# Patient Record
Sex: FEMALE | Race: BLACK OR AFRICAN AMERICAN | NOT HISPANIC OR LATINO | Employment: FULL TIME | URBAN - METROPOLITAN AREA
[De-identification: names, ages, dates, MRNs, and addresses within clinical notes are randomized per-mention and may not be internally consistent; named-entity substitution may affect disease eponyms.]

---

## 2017-02-08 ENCOUNTER — OFFICE VISIT (OUTPATIENT)
Dept: FAMILY MEDICINE | Facility: CLINIC | Age: 47
End: 2017-02-08
Payer: COMMERCIAL

## 2017-02-08 VITALS
WEIGHT: 175 LBS | HEART RATE: 83 BPM | TEMPERATURE: 96.7 F | SYSTOLIC BLOOD PRESSURE: 121 MMHG | DIASTOLIC BLOOD PRESSURE: 84 MMHG | BODY MASS INDEX: 28.12 KG/M2 | HEIGHT: 66 IN | OXYGEN SATURATION: 100 %

## 2017-02-08 DIAGNOSIS — Z00.00 ANNUAL VISIT FOR GENERAL ADULT MEDICAL EXAMINATION WITHOUT ABNORMAL FINDINGS: Primary | ICD-10-CM

## 2017-02-08 DIAGNOSIS — E55.9 VITAMIN D DEFICIENCY: Primary | ICD-10-CM

## 2017-02-08 DIAGNOSIS — I10 BENIGN ESSENTIAL HYPERTENSION: ICD-10-CM

## 2017-02-08 DIAGNOSIS — E55.9 VITAMIN D DEFICIENCY: ICD-10-CM

## 2017-02-08 DIAGNOSIS — Z12.31 VISIT FOR SCREENING MAMMOGRAM: ICD-10-CM

## 2017-02-08 DIAGNOSIS — Z90.711 S/P ABDOMINAL SUPRACERVICAL SUBTOTAL HYSTERECTOMY: ICD-10-CM

## 2017-02-08 DIAGNOSIS — R07.9 ACUTE CHEST PAIN: ICD-10-CM

## 2017-02-08 LAB
ALBUMIN SERPL-MCNC: 3.9 G/DL (ref 3.4–5)
ALP SERPL-CCNC: 55 U/L (ref 40–150)
ALT SERPL W P-5'-P-CCNC: 27 U/L (ref 0–50)
ANION GAP SERPL CALCULATED.3IONS-SCNC: 8 MMOL/L (ref 3–14)
AST SERPL W P-5'-P-CCNC: 22 U/L (ref 0–45)
BASOPHILS # BLD AUTO: 0 10E9/L (ref 0–0.2)
BASOPHILS NFR BLD AUTO: 0.2 %
BILIRUB SERPL-MCNC: 0.8 MG/DL (ref 0.2–1.3)
BUN SERPL-MCNC: 10 MG/DL (ref 7–30)
CALCIUM SERPL-MCNC: 9.2 MG/DL (ref 8.5–10.1)
CHLORIDE SERPL-SCNC: 107 MMOL/L (ref 94–109)
CHOLEST SERPL-MCNC: 232 MG/DL
CO2 SERPL-SCNC: 26 MMOL/L (ref 20–32)
CREAT SERPL-MCNC: 0.67 MG/DL (ref 0.52–1.04)
DEPRECATED CALCIDIOL+CALCIFEROL SERPL-MC: 12 UG/L (ref 20–75)
DIFFERENTIAL METHOD BLD: NORMAL
EOSINOPHIL # BLD AUTO: 0.1 10E9/L (ref 0–0.7)
EOSINOPHIL NFR BLD AUTO: 1.1 %
ERYTHROCYTE [DISTWIDTH] IN BLOOD BY AUTOMATED COUNT: 13.1 % (ref 10–15)
GFR SERPL CREATININE-BSD FRML MDRD: NORMAL ML/MIN/1.7M2
GLUCOSE SERPL-MCNC: 92 MG/DL (ref 70–99)
HCT VFR BLD AUTO: 39.6 % (ref 35–47)
HDLC SERPL-MCNC: 79 MG/DL
HGB BLD-MCNC: 13 G/DL (ref 11.7–15.7)
LDLC SERPL CALC-MCNC: 141 MG/DL
LYMPHOCYTES # BLD AUTO: 0.9 10E9/L (ref 0.8–5.3)
LYMPHOCYTES NFR BLD AUTO: 17.2 %
MCH RBC QN AUTO: 29.2 PG (ref 26.5–33)
MCHC RBC AUTO-ENTMCNC: 32.8 G/DL (ref 31.5–36.5)
MCV RBC AUTO: 89 FL (ref 78–100)
MONOCYTES # BLD AUTO: 0.4 10E9/L (ref 0–1.3)
MONOCYTES NFR BLD AUTO: 8.4 %
NEUTROPHILS # BLD AUTO: 3.8 10E9/L (ref 1.6–8.3)
NEUTROPHILS NFR BLD AUTO: 73.1 %
NONHDLC SERPL-MCNC: 153 MG/DL
PLATELET # BLD AUTO: 285 10E9/L (ref 150–450)
POTASSIUM SERPL-SCNC: 4.5 MMOL/L (ref 3.4–5.3)
PROT SERPL-MCNC: 7.5 G/DL (ref 6.8–8.8)
RBC # BLD AUTO: 4.45 10E12/L (ref 3.8–5.2)
SODIUM SERPL-SCNC: 141 MMOL/L (ref 133–144)
TRIGL SERPL-MCNC: 61 MG/DL
WBC # BLD AUTO: 5.2 10E9/L (ref 4–11)

## 2017-02-08 PROCEDURE — 85025 COMPLETE CBC W/AUTO DIFF WBC: CPT | Performed by: INTERNAL MEDICINE

## 2017-02-08 PROCEDURE — 80061 LIPID PANEL: CPT | Performed by: INTERNAL MEDICINE

## 2017-02-08 PROCEDURE — 93000 ELECTROCARDIOGRAM COMPLETE: CPT | Performed by: INTERNAL MEDICINE

## 2017-02-08 PROCEDURE — 80053 COMPREHEN METABOLIC PANEL: CPT | Performed by: INTERNAL MEDICINE

## 2017-02-08 PROCEDURE — 99396 PREV VISIT EST AGE 40-64: CPT | Performed by: INTERNAL MEDICINE

## 2017-02-08 PROCEDURE — 99212 OFFICE O/P EST SF 10 MIN: CPT | Mod: 25 | Performed by: INTERNAL MEDICINE

## 2017-02-08 PROCEDURE — 36415 COLL VENOUS BLD VENIPUNCTURE: CPT | Performed by: INTERNAL MEDICINE

## 2017-02-08 PROCEDURE — 82306 VITAMIN D 25 HYDROXY: CPT | Performed by: INTERNAL MEDICINE

## 2017-02-08 RX ORDER — ERGOCALCIFEROL 1.25 MG/1
50000 CAPSULE, LIQUID FILLED ORAL
Qty: 8 CAPSULE | Refills: 0 | Status: SHIPPED | OUTPATIENT
Start: 2017-02-08 | End: 2017-03-30

## 2017-02-08 NOTE — NURSING NOTE
"Chief Complaint   Patient presents with     Physical     fasting        Initial /84 mmHg  Pulse 83  Temp(Src) 96.7  F (35.9  C) (Oral)  Ht 5' 6\" (1.676 m)  Wt 175 lb (79.379 kg)  BMI 28.26 kg/m2  LMP 10/31/2015 (Approximate) Estimated body mass index is 28.26 kg/(m^2) as calculated from the following:    Height as of this encounter: 5' 6\" (1.676 m).    Weight as of this encounter: 175 lb (79.379 kg).  Medication Reconciliation: complete Rose Kelley MA     "

## 2017-02-08 NOTE — MR AVS SNAPSHOT
After Visit Summary   2/8/2017    Liz Jacobs    MRN: [id number]           Patient Information     Date Of Birth          1970        Visit Information        Provider Department      2/8/2017 8:00 AM Anna Palma MD [facility]        Today's Diagnoses     Annual visit for general adult medical examination without abnormal findings    -  1     Acute chest pain         Benign essential hypertension         Visit for screening mammogram         S/P abdominal supracervical subtotal hysterectomy         Vitamin D deficiency           Care Instructions      Preventive Health Recommendations  Female Ages 40 to 49    Yearly exam:     See your health care provider every year in order to  1. Review health changes.   2. Discuss preventive care.    3. Review your medicines if your doctor prescribed any.      Get a Pap test every three years (unless you have an abnormal result and your provider advises testing more often).      If you get Pap tests with HPV test, you only need to test every 5 years, unless you have an abnormal result. You do not need a Pap test if your uterus was removed (hysterectomy) and you have not had cancer.      You should be tested each year for STDs (sexually transmitted diseases), if you're at risk.       Ask your doctor if you should have a mammogram.      Have a colonoscopy (test for colon cancer) if someone in your family has had colon cancer or polyps before age 50.       Have a cholesterol test every 5 years.       Have a diabetes test (fasting glucose) after age 45. If you are at risk for diabetes, you should have this test every 3 years.    Shots: Get a flu shot each year. Get a tetanus shot every 10 years.     Nutrition:     Eat at least 5 servings of fruits and vegetables each day.    Eat whole-grain bread, whole-wheat pasta and brown rice instead of white grains and rice.    Talk to your provider about Calcium and Vitamin D.      Lifestyle    Exercise at least 150 minutes a week (an average of 30 minutes a day, 5 days a week). This will help you control your weight and prevent disease.    Limit alcohol to one drink per day.    No smoking.     Wear sunscreen to prevent skin cancer.    See your dentist every six months for an exam and cleaning.        Follow-ups after your visit        Future tests that were ordered for you today     Open Future Orders        Priority Expected Expires Ordered    MA SCREENING DIGITAL BILAT - Future  (s+30) Routine  2/8/2018 2/8/2017            Who to contact     If you have questions or need follow up information about today's clinic visit or your schedule please contact [facility] [place] directly at [phone].  Normal or non-critical lab and imaging results will be communicated to you by [email or web address], letter or phone within 4 business days after the clinic has received the results. If you do not hear from us within 7 days, please contact the clinic through [employer] or phone. If you have a critical or abnormal lab result, we will notify you by phone as soon as possible.  Submit refill requests through [employer] or call your pharmacy and they will forward the refill request to us. Please allow 3 business days for your refill to be completed.          Additional Information About Your Visit        [email or web address] Information     [employer] gives you secure access to your electronic health record. If you see a primary care provider, you can also send messages to your care team and make appointments. If you have questions, please call your primary care clinic.  If you do not have a primary care provider, please call [phone] and they will assist you.        Care EveryWhere ID     This is your Care EveryWhere ID. This could be used by other organizations to access your [facility] medical records  [id number]        Your Vitals Were     Pulse Temperature Height BMI (Body Mass Index) Pulse Oximetry Last Period     "83 96.7  F (35.9  C) (Oral) 5' 6\" (1.676 m) 28.26 kg/m2 100% 10/31/2015 (Approximate)       Blood Pressure from Last 3 Encounters:   02/08/17 121/84   01/22/16 120/80   12/20/15 149/108    Weight from Last 3 Encounters:   02/08/17 175 lb (79.379 kg)   01/22/16 172 lb (78.019 kg)   12/20/15 177 lb 7.5 oz (80.5 kg)              We Performed the Following     CBC with platelets differential     Comprehensive metabolic panel     EKG 12-lead complete w/read - Clinics     Lipid panel reflex to direct LDL     Vitamin D Deficiency          Today's Medication Changes          These changes are accurate as of: 2/8/17  9:16 AM.  If you have any questions, ask your nurse or doctor.               These medicines have changed or have updated prescriptions.        Dose/Directions    * order for DME   This may have changed:  Another medication with the same name was added. Make sure you understand how and when to take each.   Used for:  Benign essential hypertension   Changed by:  Anna Palma MD        Equipment being ordered: Outpatient blood pressure monitor   Quantity:  1 Device   Refills:  0       * order for DME   This may have changed:  You were already taking a medication with the same name, and this prescription was added. Make sure you understand how and when to take each.   Used for:  Benign essential hypertension   Changed by:  Anna Palma MD        Equipment being ordered: Blood Pressure Monitor   Quantity:  1 Device   Refills:  0       * Notice:  This list has 2 medication(s) that are the same as other medications prescribed for you. Read the directions carefully, and ask your doctor or other care provider to review them with you.      Stop taking these medicines if you haven't already. Please contact your care team if you have questions.     lisinopril 5 MG tablet   Commonly known as:  PRINIVIL/ZESTRIL   Stopped by:  Anna Palma MD                Where to get your medicines      Some of these will " need a paper prescription and others can be bought over the counter.  Ask your nurse if you have questions.     Bring a paper prescription for each of these medications    - order for DME             Primary Care Provider Office Phone # Fax #    Anna Palma [phone]       [facility] [place] [address] [place] [address] DR  [place] [address]        Thank you!     Thank you for choosing [facility]  for your care. Our goal is always to provide you with excellent care. Hearing back from our patients is one way we can continue to improve our services. Please take a few minutes to complete the written survey that you may receive in the mail after your visit with us. Thank you!             Your Updated Medication List - Protect others around you: Learn how to safely use, store and throw away your medicines at www.disposemymeds.org.          This list is accurate as of: 2/8/17  9:16 AM.  Always use your most recent med list.                   Brand Name Dispense Instructions for use    ferrous sulfate 325 (65 FE) MG tablet    IRON     Take 325 mg by mouth daily (with breakfast)       multivitamin, therapeutic with minerals Tabs tablet      Take 1 tablet by mouth daily       * order for DME     1 Device    Equipment being ordered: Outpatient blood pressure monitor       * order for DME     1 Device    Equipment being ordered: Blood Pressure Monitor       * Notice:  This list has 2 medication(s) that are the same as other medications prescribed for you. Read the directions carefully, and ask your doctor or other care provider to review them with you.

## 2017-02-08 NOTE — PROGRESS NOTES
SUBJECTIVE:     CC: Liz Jacobs is an 46 year old woman who presents for preventive health visit.     Healthy Habits:    Do you get at least three servings of calcium containing foods daily (dairy, green leafy vegetables, etc.)? yes    Amount of exercise or daily activities, outside of work: once to twice a week     Problems taking medications regularly No    Medication side effects: No    Have you had an eye exam in the past two years? yes    Do you see a dentist twice per year? Yes once     Do you have sleep apnea, excessive snoring or daytime drowsiness?yes snoring     Over the last few weeks Liz has been waking up in the morning with some aching chest discomfort, but not for the last two days. She was also experiencing some numbness in her left pinky finger. Pain in the chest would last for about a half an hour in the morning, but sometimes would come back during the day time when she was sitting at work. Sometimes she would notice a littler flutter in her heart.    The 10-year ASCVD risk score (Nishantsandeep WILSON Jr., et al., 2013) is: 1.9%    Values used to calculate the score:      Age: 46 years      Sex: Female      Is an : Yes      Diabetic: No      Tobacco smoker: No      Systolic Blood Pressure: 121 mmHg      Prescribed Antihypertensives: Yes      HDL Cholesterol: 67 mg/dL      Total Cholesterol: 227 mg/dL        Today's PHQ-2 Score:   PHQ-2 ( 1999 Pfizer) 2/8/2017   Q1: Little interest or pleasure in doing things 0   Q2: Feeling down, depressed or hopeless 0   PHQ-2 Score 0       Abuse: Current or Past(Physical, Sexual or Emotional)- No  Do you feel safe in your environment - Yes    Social History   Substance Use Topics     Smoking status: Former Smoker     Types: Cigarettes     Smokeless tobacco: Never Used      Comment: quit 20 years ago     Alcohol Use: 0.0 oz/week     0 Standard drinks or equivalent per week      Comment: glass of wine 2-3 times a week     The patient does not drink >3  "drinks per day nor >7 drinks per week.    Recent Labs   Lab Test  01/22/16   0808  01/12/11   0916   CHOL  227*  205*   HDL  67  62   LDL  146*  133*   TRIG  69  45   CHOLHDLRATIO   --   3.3   NHDL  160*   --        Reviewed orders with patient.  Reviewed health maintenance and updated orders accordingly - Yes    Mammo Decision Support:  Patient under age 50, mutual decision reflected in health maintenance.      Pertinent mammograms are reviewed under the imaging tab.  History of abnormal Pap smear: Status post benign hysterectomy. Health Maintenance and Surgical History updated.  All Histories reviewed and updated in Epic.      ROS:   ROS: 10 point ROS neg other than the symptoms noted above in the HPI.      Problem list, Medication list, Allergies, and Medical/Social/Surgical histories reviewed in TPACK and updated as appropriate.  OBJECTIVE:     /84 mmHg  Pulse 83  Temp(Src) 96.7  F (35.9  C) (Oral)  Ht 5' 6\" (1.676 m)  Wt 175 lb (79.379 kg)  BMI 28.26 kg/m2  SpO2 100%  LMP 10/31/2015 (Approximate)  EXAM:  GENERAL: healthy, alert and no distress  EYES: Eyes grossly normal to inspection, PERRL and conjunctivae and sclerae normal  HENT: ear canals and TM's normal, nose and mouth without ulcers or lesions  NECK: no adenopathy, no asymmetry, masses, or scars and thyroid normal to palpation  RESP: lungs clear to auscultation - no rales, rhonchi or wheezes  BREAST: normal without masses, tenderness or nipple discharge and no palpable axillary masses or adenopathy  CV: regular rate and rhythm, normal S1 S2, no S3 or S4, no murmur, click or rub, no peripheral edema and peripheral pulses strong  ABDOMEN: soft, nontender, no hepatosplenomegaly, no masses and bowel sounds normal  MS: no gross musculoskeletal defects noted, no edema  SKIN: no suspicious lesions or rashes  NEURO: Normal strength and tone, mentation intact and speech normal  PSYCH: mentation appears normal, affect normal/bright    ECG: Normal sinus " "rhythm. Borderline atrial enlargement, No t-wave or ST segment abnormality.     ASSESSMENT/PLAN:     1. Annual visit for general adult medical examination without abnormal findings  - Comprehensive metabolic panel  - CBC with platelets differential  - Lipid panel reflex to direct LDL    2. Acute chest pain  ECG is normal today, but Liz has a strong family of heart disease. Her 10-year ASCVD score is only 1.8% but if symptoms persist we will have a low threshold for getting a stress test.   - EKG 12-lead complete w/read - Clinics    3. Benign essential hypertension  BP good today off medication. Ordering a home BP monitor.     4. Visit for screening mammogram  - [place] SCREENING DIGITAL BILAT - Future  (s+30); Future    5. S/P abdominal supracervical subtotal hysterectomy  No longer doing pap smears.     6. Vitamin D deficiency  - Vitamin D Deficiency    COUNSELING:   Reviewed preventive health counseling, as reflected in patient instructions       Regular exercise       Healthy diet/nutrition       Osteoporosis Prevention/Bone Health       Colon cancer screening         reports that she has quit smoking. Her smoking use included Cigarettes. She has never used smokeless tobacco.    Estimated body mass index is 28.26 kg/(m^2) as calculated from the following:    Height as of this encounter: 5' 6\" (1.676 m).    Weight as of this encounter: 175 lb (79.379 kg).   Weight management plan: Discussed healthy diet and exercise guidelines and patient will follow up in 12 months in clinic to re-evaluate.    Counseling Resources:  ATP IV Guidelines  Pooled Cohorts Equation Calculator  Breast Cancer Risk Calculator  FRAX Risk Assessment  ICSI Preventive Guidelines  Dietary Guidelines for Americans, 2010  USDA's MyPlate  ASA Prophylaxis  Lung CA Screening    Anna Palma MD  [facility] [place]  "

## 2017-02-28 ENCOUNTER — HOSPITAL ENCOUNTER (OUTPATIENT)
Dept: MAMMOGRAPHY | Facility: CLINIC | Age: 47
Discharge: HOME | End: 2017-02-28
Attending: INTERNAL MEDICINE | Admitting: INTERNAL MEDICINE
Payer: COMMERCIAL

## 2017-02-28 DIAGNOSIS — Z12.31 VISIT FOR SCREENING MAMMOGRAM: ICD-10-CM

## 2017-02-28 PROCEDURE — G0202 SCR MAMMO BI INCL CAD: HCPCS

## 2018-02-20 ENCOUNTER — SURGERY (OUTPATIENT)
Age: 48
End: 2018-02-20

## 2018-02-20 ENCOUNTER — HOSPITAL ENCOUNTER (OUTPATIENT)
Facility: CLINIC | Age: 48
Discharge: HOME | End: 2018-02-20
Attending: COLON & RECTAL SURGERY | Admitting: COLON & RECTAL SURGERY
Payer: COMMERCIAL

## 2018-02-20 VITALS
SYSTOLIC BLOOD PRESSURE: 102 MMHG | RESPIRATION RATE: 22 BRPM | BODY MASS INDEX: 26.68 KG/M2 | DIASTOLIC BLOOD PRESSURE: 83 MMHG | HEIGHT: 66 IN | OXYGEN SATURATION: 97 % | WEIGHT: 166 LBS

## 2018-02-20 LAB — COLONOSCOPY: NORMAL

## 2018-02-20 PROCEDURE — 45385 COLONOSCOPY W/LESION REMOVAL: CPT | Mod: PT | Performed by: COLON & RECTAL SURGERY

## 2018-02-20 PROCEDURE — 88305 TISSUE EXAM BY PATHOLOGIST: CPT | Mod: 26 | Performed by: COLON & RECTAL SURGERY

## 2018-02-20 PROCEDURE — 25000128 H RX IP 250 OP 636: Performed by: COLON & RECTAL SURGERY

## 2018-02-20 PROCEDURE — 88305 TISSUE EXAM BY PATHOLOGIST: CPT | Performed by: COLON & RECTAL SURGERY

## 2018-02-20 PROCEDURE — G0500 MOD SEDAT ENDO SERVICE >5YRS: HCPCS | Performed by: COLON & RECTAL SURGERY

## 2018-02-20 RX ORDER — LIDOCAINE 40 MG/G
CREAM TOPICAL
Status: DISCONTINUED | OUTPATIENT
Start: 2018-02-20 | End: 2018-02-20 | Stop reason: HOSPADM

## 2018-02-20 RX ORDER — FENTANYL CITRATE 50 UG/ML
INJECTION, SOLUTION INTRAMUSCULAR; INTRAVENOUS PRN
Status: DISCONTINUED | OUTPATIENT
Start: 2018-02-20 | End: 2018-02-20 | Stop reason: HOSPADM

## 2018-02-20 RX ORDER — ONDANSETRON 2 MG/ML
4 INJECTION INTRAMUSCULAR; INTRAVENOUS EVERY 6 HOURS PRN
Status: DISCONTINUED | OUTPATIENT
Start: 2018-02-20 | End: 2018-02-20 | Stop reason: HOSPADM

## 2018-02-20 RX ORDER — ONDANSETRON 2 MG/ML
4 INJECTION INTRAMUSCULAR; INTRAVENOUS
Status: DISCONTINUED | OUTPATIENT
Start: 2018-02-20 | End: 2018-02-20 | Stop reason: HOSPADM

## 2018-02-20 RX ORDER — PROCHLORPERAZINE MALEATE 10 MG
10 TABLET ORAL EVERY 6 HOURS PRN
Status: DISCONTINUED | OUTPATIENT
Start: 2018-02-20 | End: 2018-02-20 | Stop reason: HOSPADM

## 2018-02-20 RX ORDER — ONDANSETRON 4 MG/1
4 TABLET, ORALLY DISINTEGRATING ORAL EVERY 6 HOURS PRN
Status: DISCONTINUED | OUTPATIENT
Start: 2018-02-20 | End: 2018-02-20 | Stop reason: HOSPADM

## 2018-02-20 RX ORDER — NALOXONE HYDROCHLORIDE 0.4 MG/ML
.1-.4 INJECTION, SOLUTION INTRAMUSCULAR; INTRAVENOUS; SUBCUTANEOUS
Status: DISCONTINUED | OUTPATIENT
Start: 2018-02-20 | End: 2018-02-20 | Stop reason: HOSPADM

## 2018-02-20 RX ORDER — FLUMAZENIL 0.1 MG/ML
0.2 INJECTION, SOLUTION INTRAVENOUS
Status: DISCONTINUED | OUTPATIENT
Start: 2018-02-20 | End: 2018-02-20 | Stop reason: HOSPADM

## 2018-02-20 RX ADMIN — FENTANYL CITRATE 100 MCG: 50 INJECTION, SOLUTION INTRAMUSCULAR; INTRAVENOUS at 09:48

## 2018-02-20 RX ADMIN — MIDAZOLAM 2 MG: 1 INJECTION INTRAMUSCULAR; INTRAVENOUS at 09:49

## 2018-02-20 NOTE — H&P
Pre-Endoscopy History and Physical   Liz Jacobs MRN# [id number]   YOB: 1970 Age: 47 year old   Date of Procedure: 2/20/2018   Primary care provider: Anna Palma   Type of Endoscopy: colonoscopy   Reason for Procedure: screening   Type of Anesthesia Anticipated: Moderate Sedation   HPI:   Liz is a 47 year old female for screening colonoscopy.  She last had a colonoscopy in 2012 which was normal.  She reports having had a polyp removed on a prior colonoscopy.  She denies BRBPR, abdominal pain, nausea/vomiting, changes in bowel habits or unintentional weight loss.  She denies a FH of CRC.    No Known Allergies   Prior to Admission Medications   Prescriptions Last Dose Informant Patient Reported? Taking?   ferrous sulfate (IRON) 325 (65 FE) MG tablet More than a month Self Yes No   Sig: Take 325 mg by mouth daily (with breakfast)   multivitamin, therapeutic with minerals (THERA-VIT-M) TABS More than a month Self Yes No   Sig: Take 1 tablet by mouth daily   order for DME   No No   Sig: Equipment being ordered: Outpatient blood pressure monitor   order for DME   No No   Sig: Equipment being ordered: Blood Pressure Monitor      Facility-Administered Medications: None      Patient Active Problem List   Diagnosis     Uterine leiomyoma     Lipoma of skin and subcutaneous tissue     Overweight (BMI 25.0-29.9)     S/P abdominal supracervical subtotal hysterectomy     Benign essential hypertension     Enlarged thyroid gland     Vitamin D deficiency      Past Medical History:   Diagnosis Date     Abnormal Pap smear 15 years ago.     Adenomatous polyp of rectum      Cervical dysplasia      Chickenpox      Colon polyp 5 years ago    removed     Fibroid 6-7 years ago    s/p hysterectomy Dec. 17th, 2015     H/O gastroesophageal reflux (GERD)      Mumps       Past Surgical History:   Procedure Laterality Date     CL AFF SURGICAL PATHOLOGY       COLONOSCOPY  10/22/2012    Procedure: COLONOSCOPY;   "COLONOSCOPY ;  Surgeon: Andrew Nelson MD;  Location:  GI     HYSTERECTOMY SUPRACERVICAL, BILATERAL SALPINGO-OOPHORECTOMY, COMBINED Bilateral 2015    Procedure: COMBINED HYSTERECTOMY SUPRACERVICAL, SALPINGO-OOPHORECTOMY;  Surgeon: Cassidy Matson MD;  Location:  OR     LAPAROTOMY EXPLORATORY       MAMMOPLASTY REDUCTION       MYOMECTOMY UTERUS        Social History   Substance Use Topics     Smoking status: Former Smoker     Types: Cigarettes     Smokeless tobacco: Never Used      Comment: quit 20 years ago     Alcohol use 0.0 oz/week     0 Standard drinks or equivalent per week      Comment: glass of wine 2-3 times a week      Family History   Problem Relation Age of Onset     Hypertension Mother      CEREBROVASCULAR DISEASE Mother 58     HEART DISEASE Father 42      of CHF     DIABETES Father      Hypertension Father      Family History Negative Sister      Family History Negative Brother       PHYSICAL EXAM:   /90  Ht 1.676 m (5' 6\")  Wt 75.3 kg (166 lb)  LMP 10/31/2015 (Approximate)  SpO2 98%  BMI 26.79 kg/m2 Estimated body mass index is 26.79 kg/(m^2) as calculated from the following:    Height as of this encounter: 1.676 m (5' 6\").    Weight as of this encounter: 75.3 kg (166 lb).   RESP: lungs clear to auscultation - no rales, rhonchi or wheezes   CV: regular rates and rhythm   ASA Class 2 - Mild systemic disease    Assessment: 48 y/o woman for screening colonoscopy    Plan: Colonoscopy with moderate sedation.  Risks of the procedure were discussed including, but not limited to, bleeding, perforation and missed lesions.  Patient understands and is willing to proceed.    Kurtis Howell MD ....................  2018   9:48 AM  Colon and Rectal Surgery Staff  [phone]    "

## 2018-02-21 LAB — COPATH REPORT: NORMAL

## 2018-03-02 ENCOUNTER — HOSPITAL ENCOUNTER (OUTPATIENT)
Dept: MAMMOGRAPHY | Facility: CLINIC | Age: 48
Discharge: HOME | End: 2018-03-02
Attending: OBSTETRICS & GYNECOLOGY | Admitting: OBSTETRICS & GYNECOLOGY
Payer: COMMERCIAL

## 2018-03-02 DIAGNOSIS — Z12.31 VISIT FOR SCREENING MAMMOGRAM: ICD-10-CM

## 2018-03-02 PROCEDURE — 77063 BREAST TOMOSYNTHESIS BI: CPT

## 2018-10-23 ENCOUNTER — HOSPITAL ENCOUNTER (OUTPATIENT)
Dept: MAMMOGRAPHY | Facility: CLINIC | Age: 48
End: 2018-10-23
Attending: OBSTETRICS & GYNECOLOGY
Payer: COMMERCIAL

## 2018-10-23 ENCOUNTER — HOSPITAL ENCOUNTER (OUTPATIENT)
Dept: MAMMOGRAPHY | Facility: CLINIC | Age: 48
Discharge: HOME | End: 2018-10-23
Attending: OBSTETRICS & GYNECOLOGY | Admitting: OBSTETRICS & GYNECOLOGY
Payer: COMMERCIAL

## 2018-10-23 DIAGNOSIS — N63.20 LEFT BREAST LUMP: ICD-10-CM

## 2018-10-23 PROCEDURE — G0279 TOMOSYNTHESIS, MAMMO: HCPCS

## 2018-10-23 PROCEDURE — 76642 ULTRASOUND BREAST LIMITED: CPT | Mod: LT

## 2019-12-16 ENCOUNTER — HEALTH MAINTENANCE LETTER (OUTPATIENT)
Age: 49
End: 2019-12-16

## 2020-03-22 ENCOUNTER — HEALTH MAINTENANCE LETTER (OUTPATIENT)
Age: 50
End: 2020-03-22

## 2020-08-14 ENCOUNTER — OFFICE VISIT (OUTPATIENT)
Dept: FAMILY MEDICINE | Facility: CLINIC | Age: 50
End: 2020-08-14
Payer: COMMERCIAL

## 2020-08-14 VITALS
RESPIRATION RATE: 15 BRPM | BODY MASS INDEX: 29.12 KG/M2 | DIASTOLIC BLOOD PRESSURE: 82 MMHG | SYSTOLIC BLOOD PRESSURE: 128 MMHG | WEIGHT: 181.2 LBS | TEMPERATURE: 98.1 F | HEIGHT: 66 IN | HEART RATE: 91 BPM | OXYGEN SATURATION: 100 %

## 2020-08-14 DIAGNOSIS — Z00.00 ROUTINE ADULT HEALTH MAINTENANCE: Primary | ICD-10-CM

## 2020-08-14 DIAGNOSIS — Z12.4 SCREENING FOR CERVICAL CANCER: ICD-10-CM

## 2020-08-14 DIAGNOSIS — M79.89 SOFT TISSUE MASS: ICD-10-CM

## 2020-08-14 DIAGNOSIS — Z12.39 SCREENING FOR BREAST CANCER: ICD-10-CM

## 2020-08-14 DIAGNOSIS — E04.9 ENLARGED THYROID GLAND: ICD-10-CM

## 2020-08-14 DIAGNOSIS — Z13.220 SCREENING FOR HYPERLIPIDEMIA: ICD-10-CM

## 2020-08-14 DIAGNOSIS — Z13.1 SCREENING FOR DIABETES MELLITUS: ICD-10-CM

## 2020-08-14 PROCEDURE — G0145 SCR C/V CYTO,THINLAYER,RESCR: HCPCS | Performed by: INTERNAL MEDICINE

## 2020-08-14 PROCEDURE — 80061 LIPID PANEL: CPT | Performed by: INTERNAL MEDICINE

## 2020-08-14 PROCEDURE — 82947 ASSAY GLUCOSE BLOOD QUANT: CPT | Performed by: INTERNAL MEDICINE

## 2020-08-14 PROCEDURE — 99213 OFFICE O/P EST LOW 20 MIN: CPT | Mod: 25 | Performed by: INTERNAL MEDICINE

## 2020-08-14 PROCEDURE — 36415 COLL VENOUS BLD VENIPUNCTURE: CPT | Performed by: INTERNAL MEDICINE

## 2020-08-14 PROCEDURE — 87624 HPV HI-RISK TYP POOLED RSLT: CPT | Performed by: INTERNAL MEDICINE

## 2020-08-14 PROCEDURE — 99386 PREV VISIT NEW AGE 40-64: CPT | Performed by: INTERNAL MEDICINE

## 2020-08-14 ASSESSMENT — MIFFLIN-ST. JEOR: SCORE: 1459.05

## 2020-08-14 NOTE — PROGRESS NOTES
SUBJECTIVE:   CC: Liz Jacobs is an 49 year old woman who presents for preventive health visit.     Liz lives with her significant other, Al.  She is a  for [employer].  Working from home.     Healthy Habits:     Getting at least 3 servings of Calcium per day:  NO (maybe 2 )    Bi-annual eye exam:  Yes    Dental care twice a year:  Yes    Sleep apnea or symptoms of sleep apnea:  None    Diet:  Regular (no restrictions)    Frequency of exercise:  None    Duration of exercise:  N/A    Taking medications regularly:  Not Applicable    Barriers to taking medications:  Not applicable    Medication side effects:  Not applicable    PHQ-2 Total Score: 0    Additional concerns today:: check thyroid       Lump on her abdomen - has been there for awhile.  Seems more prominent over the past few months.     History of thyroid nodules - last ultrasound in 2016, also had FNA at that time which was benign.  She feels like it might be a little larger, sometimes feels like it effects her breathing.         Today's PHQ-2 Score:   PHQ-2 ( 1999 Pfizer) 8/14/2020   Q1: Little interest or pleasure in doing things 0   Q2: Feeling down, depressed or hopeless 0   PHQ-2 Score 0       Abuse: Current or Past(Physical, Sexual or Emotional)- No  Do you feel safe in your environment? Yes        Social History     Tobacco Use     Smoking status: Former Smoker     Types: Cigarettes     Smokeless tobacco: Never Used     Tobacco comment: quit 20 years ago   Substance Use Topics     Alcohol use: Yes     Alcohol/week: 0.0 standard drinks     Comment: glass of wine 2-3 times a week     If you drink alcohol do you typically have >3 drinks per day or >7 drinks per week? No    Alcohol Use 2/8/2017   Prescreen: >3 drinks/day or >7 drinks/week? The patient does not drink >3 drinks per day nor >7 drinks per week.   No flowsheet data found.    Reviewed orders with patient.  Reviewed health maintenance and updated orders  accordingly - Yes  Patient Active Problem List   Diagnosis     Lipoma of skin and subcutaneous tissue     Overweight (BMI 25.0-29.9)     S/P abdominal supracervical subtotal hysterectomy     Enlarged thyroid gland     Vitamin D deficiency     Past Surgical History:   Procedure Laterality Date     CL AFF SURGICAL PATHOLOGY       COLONOSCOPY  10/22/2012    Procedure: COLONOSCOPY;  COLONOSCOPY ;  Surgeon: Andrew Nelson MD;  Location:  GI     HYSTERECTOMY SUPRACERVICAL, BILATERAL SALPINGO-OOPHORECTOMY, COMBINED Bilateral 2015    Procedure: COMBINED HYSTERECTOMY SUPRACERVICAL, SALPINGO-OOPHORECTOMY;  Surgeon: Cassidy Matson MD;  Location:  OR     LAPAROTOMY EXPLORATORY       MAMMOPLASTY REDUCTION       MYOMECTOMY UTERUS         Social History     Tobacco Use     Smoking status: Former Smoker     Types: Cigarettes     Smokeless tobacco: Never Used     Tobacco comment: quit 20 years ago   Substance Use Topics     Alcohol use: Yes     Alcohol/week: 0.0 standard drinks     Comment: glass of wine 2-3 times a week     Family History   Problem Relation Age of Onset     Hypertension Mother      Cerebrovascular Disease Mother 58     Heart Disease Father 42         of CHF     Diabetes Father      Hypertension Father      Family History Negative Sister      Family History Negative Brother          Current Outpatient Medications   Medication Sig Dispense Refill     order for DME Equipment being ordered: Blood Pressure Monitor 1 Device 0     order for DME Equipment being ordered: Outpatient blood pressure monitor 1 Device 0       Mammogram Screening: Patient over age 50, mutual decision to screen reflected in health maintenance.    Pertinent mammograms are reviewed under the imaging tab.  History of abnormal Pap smear: NO - age 30-65 PAP every 5 years with negative HPV co-testing recommended     Reviewed and updated as needed this visit by clinical staff  Tobacco  Allergies  Meds  Med Hx  Surg Hx  Fam Hx   "Soc Hx        Reviewed and updated as needed this visit by Provider            Review of Systems  CONSTITUTIONAL: NEGATIVE for fever, chills, change in weight  INTEGUMENTARY/SKIN: NEGATIVE for worrisome rashes, moles or lesions  EYES: NEGATIVE for vision changes or irritation  ENT: NEGATIVE for ear, mouth and throat problems  RESP: NEGATIVE for significant cough or SOB  BREAST: NEGATIVE for masses, tenderness or discharge  CV: NEGATIVE for chest pain, palpitations or peripheral edema  GI: NEGATIVE for nausea, abdominal pain, heartburn, or change in bowel habits  : NEGATIVE for unusual urinary or vaginal symptoms. No vaginal bleeding.  MUSCULOSKELETAL: NEGATIVE for significant arthralgias or myalgia  NEURO: NEGATIVE for weakness, dizziness or paresthesias  PSYCHIATRIC: NEGATIVE for changes in mood or affect      OBJECTIVE:   /82   Pulse 91   Temp 98.1  F (36.7  C) (Tympanic)   Resp 15   Ht 1.685 m (5' 6.34\")   Wt 82.2 kg (181 lb 3.2 oz)   LMP 10/31/2015 (Approximate)   SpO2 100%   BMI 28.95 kg/m    Physical Exam  GENERAL APPEARANCE: healthy, alert and no distress  EYES: Eyes grossly normal to inspection, PERRL and conjunctivae and sclerae normal  HENT: ear canals and TM's normal, mouth without ulcers or lesions, oropharynx clear and oral mucous membranes moist  NECK: no adenopathy, thyroid gland enlarged   RESP: lungs clear to auscultation - no rales, rhonchi or wheezes  BREAST: normal without masses, tenderness or nipple discharge and no palpable axillary masses or adenopathy  CV: regular rate and rhythm, normal S1 S2, no S3 or S4, no murmur, click or rub, no peripheral edema and peripheral pulses strong  ABDOMEN: soft, nontender, no hepatosplenomegaly, no masses and bowel sounds normal. ~10cm x 5cm soft tissue mass lower left abdomen   (female): normal female external genitalia, and normal cervix  MS: no musculoskeletal defects are noted and gait is age appropriate without ataxia  SKIN: no " "suspicious lesions or rashes  NEURO: Normal strength and tone, sensory exam grossly normal, mentation intact and speech normal  PSYCH: mentation appears normal and affect normal/bright        ASSESSMENT/PLAN:   1. Routine adult health maintenance    2. Soft tissue mass  Likely lipoma, it has been growing. Will get ultrasound to further eval. Consider surgical referral if it is bothersome   - US Abdomen Limited; Future    3. Enlarged thyroid gland  Previously showed benign nodules. She is a little worried since it feels larger to her, would like to get a repeat ultrasound   - US Thyroid; Future    4. Screening for diabetes mellitus  - Glucose    5. Screening for hyperlipidemia  - Lipid panel reflex to direct LDL Fasting    6. Screening for breast cancer  - *MA Screening Digital Bilateral; Future    7. Screening for cervical cancer  - Pap imaged thin layer screen with HPV - recommended age 30 - 65 years (select HPV order below)  - HPV High Risk Types DNA Cervical    COUNSELING:  Reviewed preventive health counseling, as reflected in patient instructions       Regular exercise       Healthy diet/nutrition       Osteoporosis Prevention/Bone Health    Estimated body mass index is 28.95 kg/m  as calculated from the following:    Height as of this encounter: 1.685 m (5' 6.34\").    Weight as of this encounter: 82.2 kg (181 lb 3.2 oz).    Weight management plan: Discussed healthy diet and exercise guidelines     reports that she has quit smoking. Her smoking use included cigarettes. She has never used smokeless tobacco.      Counseling Resources:  ATP IV Guidelines  Pooled Cohorts Equation Calculator  Breast Cancer Risk Calculator  FRAX Risk Assessment  ICSI Preventive Guidelines  Dietary Guidelines for Americans, 2010  USDA's MyPlate  ASA Prophylaxis  Lung CA Screening    Salma Lawson MD  [facility] [place]  "

## 2020-08-15 LAB
CHOLEST SERPL-MCNC: 221 MG/DL
GLUCOSE SERPL-MCNC: 84 MG/DL (ref 70–99)
HDLC SERPL-MCNC: 75 MG/DL
LDLC SERPL CALC-MCNC: 129 MG/DL
NONHDLC SERPL-MCNC: 146 MG/DL
TRIGL SERPL-MCNC: 87 MG/DL

## 2020-08-18 LAB
COPATH REPORT: NORMAL
PAP: NORMAL

## 2020-08-19 LAB
FINAL DIAGNOSIS: NORMAL
HPV HR 12 DNA CVX QL NAA+PROBE: NEGATIVE
HPV16 DNA SPEC QL NAA+PROBE: NEGATIVE
HPV18 DNA SPEC QL NAA+PROBE: NEGATIVE
SPECIMEN DESCRIPTION: NORMAL
SPECIMEN SOURCE CVX/VAG CYTO: NORMAL

## 2020-09-08 ENCOUNTER — HOSPITAL ENCOUNTER (OUTPATIENT)
Dept: ULTRASOUND IMAGING | Facility: CLINIC | Age: 50
End: 2020-09-08
Attending: INTERNAL MEDICINE
Payer: COMMERCIAL

## 2020-09-08 ENCOUNTER — HOSPITAL ENCOUNTER (OUTPATIENT)
Dept: MAMMOGRAPHY | Facility: CLINIC | Age: 50
End: 2020-09-08
Attending: INTERNAL MEDICINE
Payer: COMMERCIAL

## 2020-09-08 DIAGNOSIS — Z12.39 SCREENING FOR BREAST CANCER: ICD-10-CM

## 2020-09-08 DIAGNOSIS — M79.89 SOFT TISSUE MASS: ICD-10-CM

## 2020-09-08 DIAGNOSIS — E04.2 MULTIPLE THYROID NODULES: Primary | ICD-10-CM

## 2020-09-08 DIAGNOSIS — E04.9 ENLARGED THYROID GLAND: ICD-10-CM

## 2020-09-08 PROCEDURE — 77067 SCR MAMMO BI INCL CAD: CPT

## 2020-09-08 PROCEDURE — 76536 US EXAM OF HEAD AND NECK: CPT

## 2020-09-08 PROCEDURE — 76705 ECHO EXAM OF ABDOMEN: CPT

## 2020-09-15 ENCOUNTER — TELEPHONE (OUTPATIENT)
Dept: MEDSURG UNIT | Facility: CLINIC | Age: 50
End: 2020-09-15

## 2020-09-15 DIAGNOSIS — Z11.59 ENCOUNTER FOR SCREENING FOR OTHER VIRAL DISEASES: Primary | ICD-10-CM

## 2020-09-20 DIAGNOSIS — Z11.59 ENCOUNTER FOR SCREENING FOR OTHER VIRAL DISEASES: ICD-10-CM

## 2020-09-20 LAB
SARS-COV-2 RNA SPEC QL NAA+PROBE: NORMAL
SPECIMEN SOURCE: NORMAL

## 2020-09-20 PROCEDURE — U0003 INFECTIOUS AGENT DETECTION BY NUCLEIC ACID (DNA OR RNA); SEVERE ACUTE RESPIRATORY SYNDROME CORONAVIRUS 2 (SARS-COV-2) (CORONAVIRUS DISEASE [COVID-19]), AMPLIFIED PROBE TECHNIQUE, MAKING USE OF HIGH THROUGHPUT TECHNOLOGIES AS DESCRIBED BY CMS-2020-01-R: HCPCS | Performed by: INTERNAL MEDICINE

## 2020-09-20 NOTE — PROGRESS NOTES
COVID-19 PCR test completed. Patient handout For Patients Who Have Been Tested for Covid-19 (Coronavirus) was given to the patient, which includes test result notification process.    
English

## 2020-09-21 ENCOUNTER — TELEPHONE (OUTPATIENT)
Dept: MEDSURG UNIT | Facility: CLINIC | Age: 50
End: 2020-09-21

## 2020-09-21 LAB
LABORATORY COMMENT REPORT: NORMAL
SARS-COV-2 RNA SPEC QL NAA+PROBE: NEGATIVE
SPECIMEN SOURCE: NORMAL

## 2020-09-21 NOTE — TELEPHONE ENCOUNTER
Pre-Procedure Negative COVID Test     Step 1 Patient Notification  Patient notified of the negative COVID test result    Step 2 Patient Information  Verified the patient remains symptom free   Patient informed to contact the ordering provider if any of the symptoms develop prior to the procedure    Fever/Chills   Cough   Shortness of breath   New loss of taste or smell  Sore throat  Muscle or body aches  Headaches  Fatigue  Vomiting or diarrhea    Step 3 Review Visitor Policy  Patient informed of the updated visitor policy     1 visitor allowed per patient    Visitor name: pt driving self    Visitor must screen negative for COVID symptoms   Visitor must wear a mask  Waiting rooms continue to be closed to visitors    Skylar Marley RN

## 2020-09-22 ENCOUNTER — HOSPITAL ENCOUNTER (OUTPATIENT)
Facility: CLINIC | Age: 50
Discharge: HOME | End: 2020-09-22
Admitting: RADIOLOGY
Payer: COMMERCIAL

## 2020-09-22 ENCOUNTER — HOSPITAL ENCOUNTER (OUTPATIENT)
Dept: ULTRASOUND IMAGING | Facility: CLINIC | Age: 50
End: 2020-09-22
Attending: INTERNAL MEDICINE
Payer: COMMERCIAL

## 2020-09-22 VITALS
SYSTOLIC BLOOD PRESSURE: 125 MMHG | HEART RATE: 104 BPM | OXYGEN SATURATION: 99 % | RESPIRATION RATE: 16 BRPM | DIASTOLIC BLOOD PRESSURE: 91 MMHG

## 2020-09-22 DIAGNOSIS — E04.2 MULTIPLE THYROID NODULES: ICD-10-CM

## 2020-09-22 PROCEDURE — 88173 CYTOPATH EVAL FNA REPORT: CPT | Mod: 26 | Performed by: INTERNAL MEDICINE

## 2020-09-22 PROCEDURE — 25000125 ZZHC RX 250: Performed by: RADIOLOGY

## 2020-09-22 PROCEDURE — 40000863 ZZH STATISTIC RADIOLOGY XRAY, US, CT, MAR, NM

## 2020-09-22 PROCEDURE — 10006 FNA BX W/US GDN EA ADDL: CPT

## 2020-09-22 PROCEDURE — 88173 CYTOPATH EVAL FNA REPORT: CPT | Performed by: INTERNAL MEDICINE

## 2020-09-22 RX ORDER — LIDOCAINE HYDROCHLORIDE 10 MG/ML
10 INJECTION, SOLUTION EPIDURAL; INFILTRATION; INTRACAUDAL; PERINEURAL ONCE
Status: COMPLETED | OUTPATIENT
Start: 2020-09-22 | End: 2020-09-22

## 2020-09-22 RX ADMIN — LIDOCAINE HYDROCHLORIDE 10 ML: 10 INJECTION, SOLUTION EPIDURAL; INFILTRATION; INTRACAUDAL; PERINEURAL at 14:42

## 2020-09-22 NOTE — DISCHARGE INSTRUCTIONS
Thyroid/Lymph Node Biopsy Discharge Instructions     After you go home:      You may resume your normal diet    Care of Puncture Site:      You may have mild bruising, soreness & swelling at the puncture site. This will go away in a few days    For swelling & bruising, you may use an ice pack on the site.     Activity:      You may go back to your normal activity    Avoid strenuous activity for 24 hours    Medicines:      You may resume all medications    For minor pain, you may take Acetaminophen (Tylenol) or Ibuprofen (Advil)            Call the provider who ordered this test if:      Increased redness or swelling at the site    Fluid or blood oozing from the site    Severe pain at the site    Chills or a fever greater than 101 F (38 C).    Any questions or concerns    Call  911 or go to the Emergency Room if:      Trouble breathing    Your neck swells    Bleeding that you cannot control    Severe difficulty swallowing    If you have questions call:      [facility] Radiology Dept @ [phone]    The provider who performed your procedure was _________________.

## 2020-09-22 NOTE — PROGRESS NOTES
Care Suites Procedure Note    Reason for Visit: FNA Thyroid Biopsy     A/O. Denies pain. D/C instructions reviewed with pt with verbal understanding received. Copy given to pt.  All questions & concerns addressed.       Post Procedure:    Mid neck thyroid biopsy site CDI. No reddness or swelling noted. Pt denies discomfort, resp distress or difficulty swallowing. VSS.       Pt discharged per ambulatory to private vehicle. All personal belongings taken with pt.

## 2020-09-25 LAB — COPATH REPORT: NORMAL

## 2021-01-15 ENCOUNTER — HEALTH MAINTENANCE LETTER (OUTPATIENT)
Age: 51
End: 2021-01-15

## 2021-09-05 ENCOUNTER — HEALTH MAINTENANCE LETTER (OUTPATIENT)
Age: 51
End: 2021-09-05

## 2021-10-18 ENCOUNTER — ALLIED HEALTH/NURSE VISIT (OUTPATIENT)
Dept: FAMILY MEDICINE | Facility: CLINIC | Age: 51
End: 2021-10-18
Payer: COMMERCIAL

## 2021-10-18 DIAGNOSIS — Z11.1 VISIT FOR MANTOUX TEST: Primary | ICD-10-CM

## 2021-10-18 PROCEDURE — 86580 TB INTRADERMAL TEST: CPT

## 2021-10-18 PROCEDURE — 99207 PR NO CHARGE NURSE ONLY: CPT

## 2021-10-20 ENCOUNTER — ALLIED HEALTH/NURSE VISIT (OUTPATIENT)
Dept: NURSING | Facility: CLINIC | Age: 51
End: 2021-10-20
Payer: COMMERCIAL

## 2021-10-20 DIAGNOSIS — Z11.1 SCREENING EXAMINATION FOR PULMONARY TUBERCULOSIS: Primary | ICD-10-CM

## 2021-10-20 LAB
PPDINDURATION: 0 MM (ref 0–4.99)
PPDREDNESS: NORMAL

## 2021-10-20 PROCEDURE — 99207 PR NO CHARGE NURSE ONLY: CPT

## 2021-10-20 NOTE — NURSING NOTE
Patient returned for Mantoux read. No induration, redness or swelling. Result printed and sent with patient for her work. Angela Boyd RN

## 2021-10-30 ENCOUNTER — HEALTH MAINTENANCE LETTER (OUTPATIENT)
Age: 51
End: 2021-10-30

## 2021-12-25 ENCOUNTER — HEALTH MAINTENANCE LETTER (OUTPATIENT)
Age: 51
End: 2021-12-25

## 2021-12-29 ENCOUNTER — INPATIENT HOSPITAL (INPATIENT)
Dept: URBAN - METROPOLITAN AREA HOSPITAL 93 | Facility: HOSPITAL | Age: 51
End: 2021-12-29

## 2021-12-29 DIAGNOSIS — R10.9 UNSPECIFIED ABDOMINAL PAIN: ICD-10-CM

## 2021-12-29 DIAGNOSIS — R11.2 NAUSEA WITH VOMITING, UNSPECIFIED: ICD-10-CM

## 2021-12-29 DIAGNOSIS — E87.2 ACIDOSIS: ICD-10-CM

## 2021-12-29 DIAGNOSIS — D48.1 NEOPLASM OF UNCERTAIN BEHAVIOR OF CONNECTIVE AND OTHER SOFT: ICD-10-CM

## 2021-12-29 PROCEDURE — 99222 1ST HOSP IP/OBS MODERATE 55: CPT | Performed by: INTERNAL MEDICINE

## 2021-12-30 ENCOUNTER — INPATIENT HOSPITAL (INPATIENT)
Dept: URBAN - METROPOLITAN AREA HOSPITAL 93 | Facility: HOSPITAL | Age: 51
End: 2021-12-30

## 2021-12-30 DIAGNOSIS — K29.70 GASTRITIS, UNSPECIFIED, WITHOUT BLEEDING: ICD-10-CM

## 2021-12-30 DIAGNOSIS — K22.5 DIVERTICULUM OF ESOPHAGUS, ACQUIRED: ICD-10-CM

## 2021-12-30 DIAGNOSIS — R11.2 NAUSEA WITH VOMITING, UNSPECIFIED: ICD-10-CM

## 2021-12-30 PROCEDURE — 43239 EGD BIOPSY SINGLE/MULTIPLE: CPT | Performed by: INTERNAL MEDICINE

## 2022-04-03 ENCOUNTER — INPATIENT HOSPITAL (INPATIENT)
Dept: URBAN - METROPOLITAN AREA HOSPITAL 95 | Facility: HOSPITAL | Age: 52
End: 2022-04-03

## 2022-04-03 DIAGNOSIS — K74.60 UNSPECIFIED CIRRHOSIS OF LIVER: ICD-10-CM

## 2022-04-03 PROCEDURE — 99221 1ST HOSP IP/OBS SF/LOW 40: CPT | Performed by: INTERNAL MEDICINE

## 2022-05-12 ENCOUNTER — OFFICE VISIT (OUTPATIENT)
Dept: FAMILY MEDICINE | Facility: CLINIC | Age: 52
End: 2022-05-12
Payer: COMMERCIAL

## 2022-05-12 VITALS
HEART RATE: 86 BPM | WEIGHT: 186 LBS | DIASTOLIC BLOOD PRESSURE: 78 MMHG | SYSTOLIC BLOOD PRESSURE: 118 MMHG | BODY MASS INDEX: 29.89 KG/M2 | HEIGHT: 66 IN

## 2022-05-12 DIAGNOSIS — Z00.00 ROUTINE ADULT HEALTH MAINTENANCE: Primary | ICD-10-CM

## 2022-05-12 DIAGNOSIS — Z23 HIGH PRIORITY FOR 2019-NCOV VACCINE: ICD-10-CM

## 2022-05-12 PROCEDURE — 99396 PREV VISIT EST AGE 40-64: CPT | Mod: 25 | Performed by: FAMILY MEDICINE

## 2022-05-12 PROCEDURE — 36415 COLL VENOUS BLD VENIPUNCTURE: CPT | Performed by: FAMILY MEDICINE

## 2022-05-12 PROCEDURE — 86787 VARICELLA-ZOSTER ANTIBODY: CPT | Performed by: FAMILY MEDICINE

## 2022-05-12 PROCEDURE — 0054A COVID-19,PF,PFIZER (12+ YRS): CPT | Performed by: FAMILY MEDICINE

## 2022-05-12 PROCEDURE — 91305 COVID-19,PF,PFIZER (12+ YRS): CPT | Performed by: FAMILY MEDICINE

## 2022-05-12 NOTE — PROGRESS NOTES
SUBJECTIVE:   CC: Liz Jacobs is an 51 year old woman who presents for preventive health visit.       Patient has been advised of split billing requirements and indicates understanding: Yes  Healthy Habits:     Getting at least 3 servings of Calcium per day:  NO    Bi-annual eye exam:  Yes    Dental care twice a year:  Yes    Sleep apnea or symptoms of sleep apnea:  None    Diet:  Regular (no restrictions)    Frequency of exercise:  1 day/week    Duration of exercise:  30-45 minutes    Taking medications regularly:  Yes    Medication side effects:  None    PHQ-2 Total Score: 0    Additional concerns today:  No    Ability to successfully perform activities of daily living: Yes, no assistance needed  Home safety:  none identified     Today's PHQ-2 Score:   PHQ-2 ( 1999 Pfizer) 5/12/2022   Q1: Little interest or pleasure in doing things 0   Q2: Feeling down, depressed or hopeless 0   PHQ-2 Score 0   PHQ-2 Total Score (12-17 Years)- Positive if 3 or more points; Administer PHQ-A if positive -   Q1: Little interest or pleasure in doing things Not at all   Q2: Feeling down, depressed or hopeless Not at all   PHQ-2 Score 0       Abuse: Current or Past (Physical, Sexual or Emotional) - No  Do you feel safe in your environment? Yes    Social History     Tobacco Use     Smoking status: Former Smoker     Types: Cigarettes     Smokeless tobacco: Never Used     Tobacco comment: quit 20 years ago   Substance Use Topics     Alcohol use: Yes     Alcohol/week: 0.0 standard drinks     Comment: glass of wine 2-3 times a week       Alcohol Use 5/12/2022   Prescreen: >3 drinks/day or >7 drinks/week? No   Prescreen: >3 drinks/day or >7 drinks/week? -     Reviewed orders with patient.  Reviewed health maintenance and updated orders accordingly - Yes  Lab work is in process    Breast Cancer Screening:    FHS-7:   Breast CA Risk Assessment (FHS-7) 5/12/2022   Did any of your first-degree relatives have breast or ovarian cancer? Yes    Did any of your relatives have bilateral breast cancer? No   Did any man in your family have breast cancer? No   Did any woman in your family have breast and ovarian cancer? Yes   Did any woman in your family have breast cancer before age 50 y? No   Do you have 2 or more relatives with breast and/or ovarian cancer? Yes   Do you have 2 or more relatives with breast and/or bowel cancer? No     click delete button to remove this line now  Mammogram Screening: Recommended annual mammography  Pertinent mammograms are reviewed under the imaging tab.    History of abnormal Pap smear: NO - age 30-65 PAP every 5 years with negative HPV co-testing recommended  PAP / HPV Latest Ref Rng & Units 8/14/2020   PAP (Historical) - NIL   HPV16 NEG:Negative Negative   HPV18 NEG:Negative Negative   HRHPV NEG:Negative Negative     Reviewed and updated as needed this visit by clinical staff   Tobacco  Allergies  Meds                Reviewed and updated as needed this visit by Provider                   Past Medical History:   Diagnosis Date     Abnormal Pap smear 15 years ago.     Adenomatous polyp of rectum      Cervical dysplasia      Chickenpox      Colon polyp 5 years ago    removed     Fibroid 6-7 years ago    s/p hysterectomy Dec. 17th, 2015     H/O gastroesophageal reflux (GERD)      Mumps       Past Surgical History:   Procedure Laterality Date     CL AFF SURGICAL PATHOLOGY       COLONOSCOPY  10/22/2012    Procedure: COLONOSCOPY;  COLONOSCOPY ;  Surgeon: Andrew Nelson MD;  Location:  GI     HYSTERECTOMY SUPRACERVICAL, BILATERAL SALPINGO-OOPHORECTOMY, COMBINED Bilateral 12/17/2015    Procedure: COMBINED HYSTERECTOMY SUPRACERVICAL, SALPINGO-OOPHORECTOMY;  Surgeon: Cassidy Matson MD;  Location:  OR     LAPAROTOMY EXPLORATORY       MAMMOPLASTY REDUCTION  2005     MYOMECTOMY UTERUS         Review of Systems  CONSTITUTIONAL: NEGATIVE for fever, chills, change in weight  INTEGUMENTARY/SKIN: NEGATIVE for worrisome rashes,  "moles or lesions  EYES: NEGATIVE for vision changes or irritation  ENT: NEGATIVE for ear, mouth and throat problems  RESP: NEGATIVE for significant cough or SOB  BREAST: NEGATIVE for masses, tenderness or discharge  CV: NEGATIVE for chest pain, palpitations or peripheral edema  GI: NEGATIVE for nausea, abdominal pain, heartburn, or change in bowel habits  : NEGATIVE for unusual urinary or vaginal symptoms. No vaginal bleeding.  MUSCULOSKELETAL: NEGATIVE for significant arthralgias or myalgia  NEURO: NEGATIVE for weakness, dizziness or paresthesias  PSYCHIATRIC: NEGATIVE for changes in mood or affect      OBJECTIVE:   /78   Pulse 86   Ht 1.676 m (5' 6\")   Wt 84.4 kg (186 lb)   LMP 10/31/2015 (Approximate)   BMI 30.02 kg/m    Physical Exam  GENERAL APPEARANCE: healthy, alert and no distress  EYES: Eyes grossly normal to inspection, PERRL and conjunctivae and sclerae normal  HENT: ear canals and TM's normal  NECK: no adenopathy, no asymmetry, masses, or scars and thyroid normal to palpation  RESP: lungs clear to auscultation - no rales, rhonchi or wheezes  BREAST: normal without masses, tenderness or nipple discharge and no palpable axillary masses or adenopathy  CV: regular rate and rhythm, normal S1 S2, no S3 or S4, no murmur, click or rub, no peripheral edema and peripheral pulses strong  ABDOMEN: soft, nontender, no hepatosplenomegaly, no masses and bowel sounds normal  MS: no musculoskeletal defects are noted and gait is age appropriate without ataxia  SKIN: no suspicious lesions or rashes  NEURO: Normal strength and tone, sensory exam grossly normal, mentation intact and speech normal  PSYCH: mentation appears normal and affect normal/bright    Diagnostic Test Results:  Labs reviewed in Epic    ASSESSMENT/PLAN:   Liz was seen today for physical and imm/inj.    Diagnoses and all orders for this visit:    Routine adult health maintenance  -     *MA Screening Digital Bilateral; Future  -     " "Varicella Zoster Virus Antibody IgG; Future  We discussed healthy lifestyle, nutrition, cardiovascular risk reduction, self care, safety, sunscreen, and timing of cancer screening.  Health maintenance screening and immunizations reviewed with the patient.  Follow up yearly for the annual physical.     High priority for 2019-nCoV vaccine  -     COVID-19,PF,PFIZER (12+ Yrs GRAY LABEL)    Patient has been advised of split billing requirements and indicates understanding: Yes    COUNSELING:  Reviewed preventive health counseling, as reflected in patient instructions    Estimated body mass index is 30.02 kg/m  as calculated from the following:    Height as of this encounter: 1.676 m (5' 6\").    Weight as of this encounter: 84.4 kg (186 lb).    Weight management plan: Discussed healthy diet and exercise guidelines    She reports that she has quit smoking. Her smoking use included cigarettes. She has never used smokeless tobacco.      Counseling Resources:  ATP IV Guidelines  Pooled Cohorts Equation Calculator  Breast Cancer Risk Calculator  BRCA-Related Cancer Risk Assessment: FHS-7 Tool  FRAX Risk Assessment  ICSI Preventive Guidelines  Dietary Guidelines for Americans, 2010  USDA's MyPlate  ASA Prophylaxis  Lung CA Screening    Zahra Pryor MD  [facility]  "

## 2022-05-13 LAB
VZV IGG SER QL IA: >4000 INDEX
VZV IGG SER QL IA: POSITIVE

## 2022-06-01 ENCOUNTER — ANCILLARY PROCEDURE (OUTPATIENT)
Dept: MAMMOGRAPHY | Facility: CLINIC | Age: 52
End: 2022-06-01
Attending: FAMILY MEDICINE
Payer: COMMERCIAL

## 2022-06-01 DIAGNOSIS — Z00.00 ROUTINE ADULT HEALTH MAINTENANCE: ICD-10-CM

## 2022-06-01 PROCEDURE — 77067 SCR MAMMO BI INCL CAD: CPT | Mod: TC | Performed by: RADIOLOGY

## 2022-10-06 ENCOUNTER — INPATIENT HOSPITAL (INPATIENT)
Dept: URBAN - METROPOLITAN AREA HOSPITAL 40 | Facility: HOSPITAL | Age: 52
End: 2022-10-06
Payer: COMMERCIAL

## 2022-10-06 DIAGNOSIS — K74.60 UNSPECIFIED CIRRHOSIS OF LIVER: ICD-10-CM

## 2022-10-06 PROCEDURE — 99222 1ST HOSP IP/OBS MODERATE 55: CPT | Performed by: INTERNAL MEDICINE

## 2022-10-10 ENCOUNTER — INPATIENT HOSPITAL (INPATIENT)
Dept: URBAN - METROPOLITAN AREA HOSPITAL 40 | Facility: HOSPITAL | Age: 52
End: 2022-10-10
Payer: COMMERCIAL

## 2022-10-10 DIAGNOSIS — R11.2 NAUSEA WITH VOMITING, UNSPECIFIED: ICD-10-CM

## 2022-10-10 DIAGNOSIS — K74.60 UNSPECIFIED CIRRHOSIS OF LIVER: ICD-10-CM

## 2022-10-10 DIAGNOSIS — K86.1 OTHER CHRONIC PANCREATITIS: ICD-10-CM

## 2022-10-10 PROCEDURE — 99232 SBSQ HOSP IP/OBS MODERATE 35: CPT | Performed by: INTERNAL MEDICINE

## 2022-10-22 ENCOUNTER — HEALTH MAINTENANCE LETTER (OUTPATIENT)
Age: 52
End: 2022-10-22

## 2023-05-01 ENCOUNTER — TRANSFERRED RECORDS (OUTPATIENT)
Dept: MULTI SPECIALTY CLINIC | Facility: CLINIC | Age: 53
End: 2023-05-01

## 2023-06-18 ENCOUNTER — HEALTH MAINTENANCE LETTER (OUTPATIENT)
Age: 53
End: 2023-06-18

## 2023-06-20 ENCOUNTER — HOSPITAL ENCOUNTER (OUTPATIENT)
Dept: MAMMOGRAPHY | Facility: CLINIC | Age: 53
Discharge: HOME | End: 2023-06-20
Payer: COMMERCIAL

## 2023-06-20 DIAGNOSIS — Z12.31 VISIT FOR SCREENING MAMMOGRAM: ICD-10-CM

## 2023-06-20 PROCEDURE — 77067 SCR MAMMO BI INCL CAD: CPT

## 2023-07-18 ENCOUNTER — OFFICE VISIT (OUTPATIENT)
Dept: MIDWIFE SERVICES | Facility: CLINIC | Age: 53
End: 2023-07-18
Payer: COMMERCIAL

## 2023-07-18 VITALS
HEIGHT: 66 IN | BODY MASS INDEX: 29.41 KG/M2 | SYSTOLIC BLOOD PRESSURE: 124 MMHG | HEART RATE: 88 BPM | DIASTOLIC BLOOD PRESSURE: 86 MMHG | WEIGHT: 183 LBS

## 2023-07-18 DIAGNOSIS — R31.9 HEMATURIA, UNSPECIFIED TYPE: Primary | ICD-10-CM

## 2023-07-18 DIAGNOSIS — Z13.9 SCREENING FOR CONDITION: ICD-10-CM

## 2023-07-18 DIAGNOSIS — Z78.0 MENOPAUSE: ICD-10-CM

## 2023-07-18 LAB
ALBUMIN UR-MCNC: ABNORMAL MG/DL
APPEARANCE UR: CLEAR
BILIRUB UR QL STRIP: NEGATIVE
CLUE CELLS: ABNORMAL
COLOR UR AUTO: YELLOW
FSH SERPL IRP2-ACNC: 41 MIU/ML
GLUCOSE UR STRIP-MCNC: NEGATIVE MG/DL
HGB UR QL STRIP: NEGATIVE
KETONES UR STRIP-MCNC: NEGATIVE MG/DL
LEUKOCYTE ESTERASE UR QL STRIP: ABNORMAL
NITRATE UR QL: NEGATIVE
PH UR STRIP: 5.5 [PH] (ref 5–8)
SP GR UR STRIP: 1.02 (ref 1–1.03)
TRICHOMONAS, WET PREP: ABNORMAL
UROBILINOGEN UR STRIP-ACNC: 1 E.U./DL
WBC'S/HIGH POWER FIELD, WET PREP: ABNORMAL
YEAST, WET PREP: ABNORMAL

## 2023-07-18 PROCEDURE — 36415 COLL VENOUS BLD VENIPUNCTURE: CPT | Performed by: MIDWIFE

## 2023-07-18 PROCEDURE — 83001 ASSAY OF GONADOTROPIN (FSH): CPT | Performed by: MIDWIFE

## 2023-07-18 PROCEDURE — 87210 SMEAR WET MOUNT SALINE/INK: CPT | Performed by: MIDWIFE

## 2023-07-18 PROCEDURE — 81003 URINALYSIS AUTO W/O SCOPE: CPT | Performed by: MIDWIFE

## 2023-07-18 PROCEDURE — 87624 HPV HI-RISK TYP POOLED RSLT: CPT | Performed by: MIDWIFE

## 2023-07-18 PROCEDURE — 99203 OFFICE O/P NEW LOW 30 MIN: CPT | Performed by: MIDWIFE

## 2023-07-18 PROCEDURE — G0145 SCR C/V CYTO,THINLAYER,RESCR: HCPCS | Performed by: MIDWIFE

## 2023-07-18 PROCEDURE — 87086 URINE CULTURE/COLONY COUNT: CPT | Performed by: MIDWIFE

## 2023-07-18 NOTE — PROGRESS NOTES
Subjective:     Liz Jacobs a new patient to the [facility] is a 52 year old female who was self-referred for evaluation and treatment of gross hematuria. Onset of hematuria was 2 weeks ago and lasted 2-3 days. Other urologic symptoms include none. Patient reports history of  none . Patient reports history of  hysterectomy done >10 years ago for fibroids, both ovaries and cervix intact. Has not had a pap smear since hysterectomy. No new sexual partners. Denies change in vaginal odor or discharge and  no risk factors for cancer. Prior workup has been none. Prior workup has revealed no etiology. Pt desires blood test today to see if she is in menopause, no menses since hysterectomy >10yr ago.     The following portions of the patient's history were reviewed and updated as appropriate: allergies, current medications, past social history, past surgical history, and problem list.    Review of Systems  Pertinent items are noted in HPI.      Objective:   General appearance: alert, appears stated age, and cooperative  Abdomen: soft,non tender, no organomegaly  Pelvic: cervix normal in appearance, external genitalia normal, no adnexal masses or tenderness, no cervical motion tenderness, and vagina normal without discharge scant amt of pinkish discharge noted at cervical os    Results Review  Urinalysis on 7/18/23  Urine analysis shows +leukocytes, +protein  specimen sent for urine culture  No results found for: UA  Lab Results   Component Value Date    WBC 5.2 02/08/2017    HGB 13.0 02/08/2017    HCT 39.6 02/08/2017    MCV 89 02/08/2017     02/08/2017     Lab Results   Component Value Date    BUN 10 02/08/2017      Cystoscopy not performed  Abdominal Imaging orders placed for pelvic ultrasound. Pt will self schedule per her preference if no other reason found     Assessment:   gross hematuria 2 weeks ago     Plan:   UA/UC  Wet prep  Pap smear  Pelvic ultrasound  FSH   STI testing offered and pt  declined  30minutes on the date of the encounter doing chart review, interpretation of tests, patient visit, and documentation

## 2023-07-20 LAB — BACTERIA UR CULT: NO GROWTH

## 2023-07-21 ENCOUNTER — PATIENT OUTREACH (OUTPATIENT)
Dept: GASTROENTEROLOGY | Facility: CLINIC | Age: 53
End: 2023-07-21
Payer: COMMERCIAL

## 2023-07-21 DIAGNOSIS — Z12.11 SPECIAL SCREENING FOR MALIGNANT NEOPLASMS, COLON: Primary | ICD-10-CM

## 2023-07-21 LAB
BKR LAB AP GYN ADEQUACY: NORMAL
BKR LAB AP GYN INTERPRETATION: NORMAL
BKR LAB AP HPV REFLEX: NORMAL
BKR LAB AP LMP: NORMAL
BKR LAB AP PREVIOUS ABNORMAL: NORMAL
PATH REPORT.COMMENTS IMP SPEC: NORMAL
PATH REPORT.COMMENTS IMP SPEC: NORMAL
PATH REPORT.RELEVANT HX SPEC: NORMAL

## 2023-07-21 NOTE — PROGRESS NOTES
"Ordering/Referring Provider: Konstantin Sotelo Shoua Theresa, MD    BMI: Estimated body mass index is 29.54 kg/m  as calculated from the following:    Height as of 7/18/23: 1.676 m (5' 6\").    Weight as of 7/18/23: 83 kg (183 lb).     Sedation:  Based on patient's medical history patient is appropriate for   moderate sedation. If patient's BMI > 50 do not schedule in ASC.    Location:  Does patient have an LVAD?  No    Does patient have a history of moderate to severe sleep apnea?  No    Does patient have a history of asthma, COPD or any other lung disease?  No    Does patient have a history of cardiac disease?  No    Is patient awaiting a heart or lung transplant?   No    Has patient had a stroke or transient ischemic attack in the last 6 months?   No    Is the patient currently on dialysis?   No    Prep:  Previous prep (last colonoscopy):   could not locate    Quality of previous prep:   Excellent    Is patient currently on dialysis, is ESRD, or CKD stage 4/5?   No (standard prep)    Does patient have a diagnosis of diabetes?  No    Does patient have a diagnosis of cystic fibrosis (CF)?  No    BMI > 40?  No    Final Referral Status:  meets the criteria for placement of colonoscopy screening  referral order.      Referral order placed with the following recommendations:  Sedation: Moderate Sedation  Location Type: ASC  Prep: MiraLAX (No Mag Citrate)        "

## 2023-07-24 LAB
HUMAN PAPILLOMA VIRUS 16 DNA: NEGATIVE
HUMAN PAPILLOMA VIRUS 18 DNA: NEGATIVE
HUMAN PAPILLOMA VIRUS FINAL DIAGNOSIS: NORMAL
HUMAN PAPILLOMA VIRUS OTHER HR: NEGATIVE

## 2023-07-30 DIAGNOSIS — R31.9 HEMATURIA, UNSPECIFIED TYPE: Primary | ICD-10-CM

## 2023-07-31 ENCOUNTER — PATIENT OUTREACH (OUTPATIENT)
Dept: MIDWIFE SERVICES | Facility: CLINIC | Age: 53
End: 2023-07-31
Payer: COMMERCIAL

## 2023-08-01 ENCOUNTER — HOSPITAL ENCOUNTER (OUTPATIENT)
Dept: ULTRASOUND IMAGING | Facility: CLINIC | Age: 53
Discharge: HOME | End: 2023-08-01
Attending: MIDWIFE | Admitting: MIDWIFE
Payer: COMMERCIAL

## 2023-08-01 DIAGNOSIS — R31.9 HEMATURIA, UNSPECIFIED TYPE: ICD-10-CM

## 2023-08-01 PROCEDURE — 76770 US EXAM ABDO BACK WALL COMP: CPT

## 2024-03-04 ENCOUNTER — OFFICE VISIT (OUTPATIENT)
Dept: FAMILY MEDICINE | Facility: CLINIC | Age: 54
End: 2024-03-04
Payer: COMMERCIAL

## 2024-03-04 VITALS
HEIGHT: 66 IN | HEART RATE: 91 BPM | SYSTOLIC BLOOD PRESSURE: 137 MMHG | BODY MASS INDEX: 30.49 KG/M2 | DIASTOLIC BLOOD PRESSURE: 92 MMHG | WEIGHT: 189.7 LBS | OXYGEN SATURATION: 100 % | TEMPERATURE: 98.6 F | RESPIRATION RATE: 16 BRPM

## 2024-03-04 DIAGNOSIS — Z86.0100 HISTORY OF COLONIC POLYPS: ICD-10-CM

## 2024-03-04 DIAGNOSIS — Z11.4 SCREENING FOR HIV (HUMAN IMMUNODEFICIENCY VIRUS): ICD-10-CM

## 2024-03-04 DIAGNOSIS — R03.0 ELEVATED BLOOD PRESSURE READING WITHOUT DIAGNOSIS OF HYPERTENSION: ICD-10-CM

## 2024-03-04 DIAGNOSIS — Z11.59 NEED FOR HEPATITIS C SCREENING TEST: ICD-10-CM

## 2024-03-04 DIAGNOSIS — Z00.00 ROUTINE GENERAL MEDICAL EXAMINATION AT A HEALTH CARE FACILITY: Primary | ICD-10-CM

## 2024-03-04 DIAGNOSIS — Z13.220 SCREENING FOR LIPID DISORDERS: ICD-10-CM

## 2024-03-04 DIAGNOSIS — R00.2 PALPITATIONS: ICD-10-CM

## 2024-03-04 DIAGNOSIS — E04.9 ENLARGED THYROID GLAND: ICD-10-CM

## 2024-03-04 DIAGNOSIS — E55.9 VITAMIN D DEFICIENCY: ICD-10-CM

## 2024-03-04 LAB
ALBUMIN SERPL BCG-MCNC: 4.4 G/DL (ref 3.5–5.2)
ALP SERPL-CCNC: 69 U/L (ref 40–150)
ALT SERPL W P-5'-P-CCNC: 19 U/L (ref 0–50)
ANION GAP SERPL CALCULATED.3IONS-SCNC: 10 MMOL/L (ref 7–15)
AST SERPL W P-5'-P-CCNC: 21 U/L (ref 0–45)
ATRIAL RATE - MUSE: 77 BPM
BILIRUB SERPL-MCNC: 0.6 MG/DL
BUN SERPL-MCNC: 16.8 MG/DL (ref 6–20)
CALCIUM SERPL-MCNC: 9.1 MG/DL (ref 8.6–10)
CHLORIDE SERPL-SCNC: 103 MMOL/L (ref 98–107)
CHOLEST SERPL-MCNC: 226 MG/DL
CREAT SERPL-MCNC: 0.82 MG/DL (ref 0.51–0.95)
DEPRECATED HCO3 PLAS-SCNC: 25 MMOL/L (ref 22–29)
DIASTOLIC BLOOD PRESSURE - MUSE: 93 MMHG
EGFRCR SERPLBLD CKD-EPI 2021: 84 ML/MIN/1.73M2
ERYTHROCYTE [DISTWIDTH] IN BLOOD BY AUTOMATED COUNT: 13.2 % (ref 10–15)
FASTING STATUS PATIENT QL REPORTED: YES
GLUCOSE SERPL-MCNC: 91 MG/DL (ref 70–99)
HCT VFR BLD AUTO: 39.5 % (ref 35–47)
HCV AB SERPL QL IA: NONREACTIVE
HDLC SERPL-MCNC: 67 MG/DL
HGB BLD-MCNC: 13.1 G/DL (ref 11.7–15.7)
HIV 1+2 AB+HIV1 P24 AG SERPL QL IA: NONREACTIVE
INTERPRETATION ECG - MUSE: NORMAL
LDLC SERPL CALC-MCNC: 135 MG/DL
MCH RBC QN AUTO: 29 PG (ref 26.5–33)
MCHC RBC AUTO-ENTMCNC: 33.2 G/DL (ref 31.5–36.5)
MCV RBC AUTO: 87 FL (ref 78–100)
NONHDLC SERPL-MCNC: 159 MG/DL
P AXIS - MUSE: 28 DEGREES
PLATELET # BLD AUTO: 267 10E3/UL (ref 150–450)
POTASSIUM SERPL-SCNC: 4.5 MMOL/L (ref 3.4–5.3)
PR INTERVAL - MUSE: 156 MS
PROT SERPL-MCNC: 7.3 G/DL (ref 6.4–8.3)
QRS DURATION - MUSE: 72 MS
QT - MUSE: 414 MS
QTC - MUSE: 468 MS
R AXIS - MUSE: 36 DEGREES
RBC # BLD AUTO: 4.52 10E6/UL (ref 3.8–5.2)
SODIUM SERPL-SCNC: 138 MMOL/L (ref 135–145)
SYSTOLIC BLOOD PRESSURE - MUSE: 130 MMHG
T AXIS - MUSE: 40 DEGREES
TRIGL SERPL-MCNC: 120 MG/DL
TSH SERPL DL<=0.005 MIU/L-ACNC: 1.48 UIU/ML (ref 0.3–4.2)
VENTRICULAR RATE- MUSE: 77 BPM
VIT D+METAB SERPL-MCNC: 28 NG/ML (ref 20–50)
WBC # BLD AUTO: 6 10E3/UL (ref 4–11)

## 2024-03-04 PROCEDURE — 36415 COLL VENOUS BLD VENIPUNCTURE: CPT

## 2024-03-04 PROCEDURE — 99213 OFFICE O/P EST LOW 20 MIN: CPT | Mod: 25

## 2024-03-04 PROCEDURE — 86803 HEPATITIS C AB TEST: CPT

## 2024-03-04 PROCEDURE — 99396 PREV VISIT EST AGE 40-64: CPT | Mod: 25

## 2024-03-04 PROCEDURE — 80053 COMPREHEN METABOLIC PANEL: CPT

## 2024-03-04 PROCEDURE — 93010 ELECTROCARDIOGRAM REPORT: CPT | Performed by: INTERNAL MEDICINE

## 2024-03-04 PROCEDURE — 80061 LIPID PANEL: CPT

## 2024-03-04 PROCEDURE — 91320 SARSCV2 VAC 30MCG TRS-SUC IM: CPT

## 2024-03-04 PROCEDURE — 87389 HIV-1 AG W/HIV-1&-2 AB AG IA: CPT

## 2024-03-04 PROCEDURE — 82306 VITAMIN D 25 HYDROXY: CPT

## 2024-03-04 PROCEDURE — 93005 ELECTROCARDIOGRAM TRACING: CPT

## 2024-03-04 PROCEDURE — 84443 ASSAY THYROID STIM HORMONE: CPT

## 2024-03-04 PROCEDURE — 90480 ADMN SARSCOV2 VAC 1/ONLY CMP: CPT

## 2024-03-04 PROCEDURE — 85027 COMPLETE CBC AUTOMATED: CPT

## 2024-03-04 SDOH — HEALTH STABILITY: PHYSICAL HEALTH: ON AVERAGE, HOW MANY MINUTES DO YOU ENGAGE IN EXERCISE AT THIS LEVEL?: 30 MIN

## 2024-03-04 SDOH — HEALTH STABILITY: PHYSICAL HEALTH: ON AVERAGE, HOW MANY DAYS PER WEEK DO YOU ENGAGE IN MODERATE TO STRENUOUS EXERCISE (LIKE A BRISK WALK)?: 2 DAYS

## 2024-03-04 ASSESSMENT — SOCIAL DETERMINANTS OF HEALTH (SDOH): HOW OFTEN DO YOU GET TOGETHER WITH FRIENDS OR RELATIVES?: ONCE A WEEK

## 2024-03-04 NOTE — PROGRESS NOTES
Preventive Care Visit  [facility]  MICHELLE Lang CNP, Family Medicine  Mar 4, 2024      Assessment & Plan   Problem List Items Addressed This Visit       Enlarged thyroid gland     Patient reports that she had a fine needle biopsy completed in 2016 which was normal. She has had normal TSH, not checked since 2016. Given new elevations in blood pressure, will check TSH today. Physical examination is unrevealing.         Vitamin D deficiency     Patient reports that she is not currently supplementing. She is interested in updated labs today and these orders were placed.         Relevant Orders    Vitamin D Deficiency    History of colonic polyps     Patient reports she is up to date on colonoscopy screening; will abstract data today.          Routine general medical examination at a health care facility - Primary     Annual exam. New patient.  Mammogram: UTD  PAP: UTD  Colonoscopy: UTD per patient  Immunizations: COVID today  Labs: Discussed and offered  BMI: 30.62. Discussed diet and exercise strategies.   Mood: stable  Bone health: Discussed. No indication for early DEXA.   Follow up in one year for annual exam or sooner if needed/indicated.         Elevated blood pressure reading without diagnosis of hypertension     Blood pressure today in clinic is elevated at 137/92.  She notes that she has had similar readings at home.  Chart review shows that she has been normotensive as recently as last summer with her last office visit.  She has a strong family history of hypertension and heart disease, including early cardiac disease in her father.  Desires for aggressive monitoring/management of blood pressure.  We will draw labs today, including TSH, CMP and CBC to assess for any underlying causes.  EKG was completed today in clinic and showed left atrial enlargement, which was also noted in 2017 well as a possible inferior infarct.  Should consider stress testing.  She has recently began  "some significant lifestyle changes, specifically diet related which we discussed could assist in bringing her blood pressure within normal range as it is just mildly elevated.  We discussed diet and exercise as it pertains to heart health, specifically the Mediterranean diet, regular cardiovascular exercise and weight management.  He has drastically reduced alcohol which I encouraged her to continue.  She is not smoking.  I would like her to return in 2 to 4 weeks for nurse only blood pressure check and bring her machine with her.  Patient expressed understanding of and agreement with this plan.  All questions were answered.         Relevant Orders    Comprehensive metabolic panel (BMP + Alb, Alk Phos, ALT, AST, Total. Bili, TP)    TSH with free T4 reflex    EKG 12-lead, tracing only (Completed)    CBC with platelets (Completed)    Palpitations     Patient notes mild palpitations at night when she lies down for bed, which seemed to begin as she was noticing elevations in her blood pressure. No associated symptoms such as chest pain, dyspnea or orthopnea. Labs per orders. EKG negative for arrhythmia.          Relevant Orders    Comprehensive metabolic panel (BMP + Alb, Alk Phos, ALT, AST, Total. Bili, TP)    TSH with free T4 reflex    EKG 12-lead, tracing only (Completed)    CBC with platelets (Completed)     Other Visit Diagnoses       Screening for HIV (human immunodeficiency virus)        Relevant Orders    HIV Antigen Antibody Combo    Need for hepatitis C screening test        Relevant Orders    Hepatitis C Screen Reflex to HCV RNA Quant and Genotype    Screening for lipid disorders        Relevant Orders    Lipid panel reflex to direct LDL Fasting            BMI  Estimated body mass index is 30.62 kg/m  as calculated from the following:    Height as of this encounter: 1.676 m (5' 6\").    Weight as of this encounter: 86 kg (189 lb 11.2 oz).   Weight management plan: Discussed healthy diet and exercise " guidelines    Counseling  Appropriate preventive services were discussed with this patient, including applicable screening as appropriate for fall prevention, nutrition, physical activity, Tobacco-use cessation, weight loss and cognition.  Checklist reviewing preventive services available has been given to the patient.  Reviewed patient's diet, addressing concerns and/or questions.   She is at risk for lack of exercise and has been provided with information to increase physical activity for the benefit of her well-being.   The patient was instructed to see the dentist every 6 months.     Duncan Hill is a 53 year old, presenting for the following:  Physical        3/4/2024     8:31 AM   Additional Questions   Roomed by ac   Accompanied by self        Via the Health Maintenance questionnaire, the patient has reported the following services have been completed -Colonscopy, this information has been sent to the abstraction team.  Health Care Directive  Patient does not have a Health Care Directive or Living Will: Discussed advance care planning with patient; information given to patient to review.    Hoping to focus on blood pressure  Patient notes that she has been having some elevations while using a home blood pressure cuff.   Family history of hypertension, although patient has historically had very good measurements.          3/4/2024   General Health   How would you rate your overall physical health? Good   Feel stress (tense, anxious, or unable to sleep) Not at all         3/4/2024   Nutrition   Three or more servings of calcium each day? Yes   Diet: Regular (no restrictions)   How many servings of fruit and vegetables per day? (!) 0-1   How many sweetened beverages each day? 0-1   With recent biometric screening through work, her and her  have been increasing fruits and vegetables, decreasing ETOH and reducing processed foods/eating out.         3/4/2024   Exercise   Days per week of  moderate/strenous exercise 2 days   Average minutes spent exercising at this level 30 min   (!) EXERCISE CONCERN  Works from home. Sedentary during the day. Has a standing desk.       3/4/2024   Social Factors   Frequency of gathering with friends or relatives Once a week   Worry food won't last until get money to buy more No   Food not last or not have enough money for food? No   Do you have housing?  Yes   Are you worried about losing your housing? No   Lack of transportation? No   Unable to get utilities (heat,electricity)? No         3/4/2024   Fall Risk   Fallen 2 or more times in the past year? No   Trouble with walking or balance? No          3/4/2024   Dental   Dentist two times every year? (!) NO     Today's PHQ-2 Score:       3/4/2024     8:23 AM   PHQ-2 ( 1999 [employer])   Q1: Little interest or pleasure in doing things 0   Q2: Feeling down, depressed or hopeless 0   PHQ-2 Score 0   Q1: Little interest or pleasure in doing things Not at all   Q2: Feeling down, depressed or hopeless Not at all   PHQ-2 Score 0         3/4/2024   Substance Use   Alcohol more than 3/day or more than 7/wk No   Do you use any other substances recreationally? No     Social History     Tobacco Use    Smoking status: Former     Types: Cigarettes    Smokeless tobacco: Never    Tobacco comments:     quit 20 years ago   Vaping Use    Vaping Use: Never used   Substance Use Topics    Alcohol use: Yes     Alcohol/week: 0.0 standard drinks of alcohol     Comment: glass of wine 2-3 times a week    Drug use: No             6/20/2023   LAST FHS-7 RESULTS   1st degree relative breast or ovarian cancer Yes   Any relative bilateral breast cancer No   Any male have breast cancer No   Any ONE woman have BOTH breast AND ovarian cancer No   Any woman with breast cancer before 50yrs No   2 or more relatives with breast AND/OR ovarian cancer Yes   2 or more relatives with breast AND/OR bowel cancer No     Mammogram Screening - Mammogram every 1-2 years  "updated in Health Maintenance based on mutual decision making        3/4/2024   One time HIV Screening   Previous HIV test? No         3/4/2024   STI Screening   New sexual partner(s) since last STI/HIV test? No     History of abnormal Pap smear: NO - age 30-65 PAP every 5 years with negative HPV co-testing recommended        Latest Ref Rng & Units 7/18/2023     3:35 PM 8/14/2020    12:26 PM 8/14/2020    12:25 PM   PAP / HPV   PAP  Negative for Intraepithelial Lesion or Malignancy (NILM)      PAP (Historical)   NIL     HPV 16 DNA Negative Negative   Negative    HPV 18 DNA Negative Negative   Negative    Other HR HPV Negative Negative   Negative      ASCVD Risk   The 10-year ASCVD risk score (Zahraa ESTRADA, et al., 2019) is: 3.3%    Values used to calculate the score:      Age: 53 years      Sex: Female      Is Non- : Yes      Diabetic: No      Tobacco smoker: No      Systolic Blood Pressure: 137 mmHg      Is BP treated: No      HDL Cholesterol: 75 mg/dL      Total Cholesterol: 221 mg/dL    Reviewed and updated as needed this visit by Provider   Tobacco  Allergies  Meds  Problems  Med Hx  Surg Hx  Fam Hx             Objective    Exam  BP (!) 137/92 (BP Location: Left arm, Patient Position: Sitting, Cuff Size: Adult Large)   Pulse 91   Temp 98.6  F (37  C) (Oral)   Resp 16   Ht 1.676 m (5' 6\")   Wt 86 kg (189 lb 11.2 oz)   LMP 10/31/2015 (Approximate)   SpO2 100%   BMI 30.62 kg/m     Estimated body mass index is 30.62 kg/m  as calculated from the following:    Height as of this encounter: 1.676 m (5' 6\").    Weight as of this encounter: 86 kg (189 lb 11.2 oz).    Physical Exam  GENERAL: alert and no distress  EYES: Eyes grossly normal to inspection, PERRL and conjunctivae and sclerae normal  HENT: ear canals and TM's normal, nose and mouth without ulcers or lesions  NECK: no adenopathy, no asymmetry, masses, or scars. No thyromegaly.   RESP: lungs clear to auscultation - no " rales, rhonchi or wheezes  CV: regular rate and rhythm, normal S1 S2, no S3 or S4, no murmur, click or rub, no peripheral edema  ABDOMEN: soft, nontender, no hepatosplenomegaly, no masses and bowel sounds normal  MS: no gross musculoskeletal defects noted, no edema  SKIN: no suspicious lesions or rashes  NEURO: Normal strength and tone, mentation intact and speech normal  PSYCH: mentation appears normal, affect normal/bright      Signed Electronically by: MICHELLE Lang CNP

## 2024-03-04 NOTE — ASSESSMENT & PLAN NOTE
Blood pressure today in clinic is elevated at 137/92.  She notes that she has had similar readings at home.  Chart review shows that she has been normotensive as recently as last summer with her last office visit.  She has a strong family history of hypertension and heart disease, including early cardiac disease in her father.  Desires for aggressive monitoring/management of blood pressure.  We will draw labs today, including TSH, CMP and CBC to assess for any underlying causes.  EKG was completed today in clinic and showed left atrial enlargement, which was also noted in 2017 well as a possible inferior infarct.  Should consider stress testing.  She has recently began some significant lifestyle changes, specifically diet related which we discussed could assist in bringing her blood pressure within normal range as it is just mildly elevated.  We discussed diet and exercise as it pertains to heart health, specifically the Mediterranean diet, regular cardiovascular exercise and weight management.  He has drastically reduced alcohol which I encouraged her to continue.  She is not smoking.  I would like her to return in 2 to 4 weeks for nurse only blood pressure check and bring her machine with her.  Patient expressed understanding of and agreement with this plan.  All questions were answered.

## 2024-03-04 NOTE — ASSESSMENT & PLAN NOTE
Patient notes mild palpitations at night when she lies down for bed, which seemed to begin as she was noticing elevations in her blood pressure. No associated symptoms such as chest pain, dyspnea or orthopnea. Labs per orders. EKG negative for arrhythmia.

## 2024-03-04 NOTE — ASSESSMENT & PLAN NOTE
Patient reports that she is not currently supplementing. She is interested in updated labs today and these orders were placed.

## 2024-03-04 NOTE — ASSESSMENT & PLAN NOTE
Annual exam. New patient.  Mammogram: UTD  PAP: UTD  Colonoscopy: UTD per patient  Immunizations: COVID today  Labs: Discussed and offered  BMI: 30.62. Discussed diet and exercise strategies.   Mood: stable  Bone health: Discussed. No indication for early DEXA.   Follow up in one year for annual exam or sooner if needed/indicated.

## 2024-03-04 NOTE — ASSESSMENT & PLAN NOTE
Patient reports that she had a fine needle biopsy completed in 2016 which was normal. She has had normal TSH, not checked since 2016. Given new elevations in blood pressure, will check TSH today. Physical examination is unrevealing.

## 2024-03-04 NOTE — PATIENT INSTRUCTIONS
Preventive Care Advice   This is general advice given by our system to help you stay healthy. However, your care team may have specific advice just for you. Please talk to your care team about your preventive care needs.  Nutrition  Eat 5 or more servings of fruits and vegetables each day.  Try wheat bread, brown rice and whole grain pasta (instead of white bread, rice, and pasta).  Get enough calcium and vitamin D. Check the label on foods and aim for 100% of the RDA (recommended daily allowance).  Lifestyle  Exercise at least 150 minutes each week   (30 minutes a day, 5 days a week).  Do muscle strengthening activities 2 days a week. These help control your weight and prevent disease.  No smoking.  Wear sunscreen to prevent skin cancer.  Have a dental exam and cleaning every 6 months.  Yearly exams  See your health care team every year to talk about:  Any changes in your health.  Any medicines your care team has prescribed.  Preventive care, family planning, and ways to prevent chronic diseases.  Shots (vaccines)   HPV shots (up to age 26), if you've never had them before.  Hepatitis B shots (up to age 59), if you've never had them before.  COVID-19 shot: Get this shot when it's due.  Flu shot: Get a flu shot every year.  Tetanus shot: Get a tetanus shot every 10 years.  Pneumococcal, hepatitis A, and RSV shots: Ask your care team if you need these based on your risk.  Shingles shot (for age 50 and up).  General health tests  Diabetes screening:  Starting at age 35, Get screened for diabetes at least every 3 years.  If you are younger than age 35, ask your care team if you should be screened for diabetes.  Cholesterol test: At age 39, start having a cholesterol test every 5 years, or more often if advised.  Bone density scan (DEXA): At age 50, ask your care team if you should have this scan for osteoporosis (brittle bones).  Hepatitis C: Get tested at least once in your life.  STIs (sexually transmitted  infections)  Before age 24: Ask your care team if you should be screened for STIs.  After age 24: Get screened for STIs if you're at risk. You are at risk for STIs (including HIV) if:  You are sexually active with more than one person.  You don't use condoms every time.  You or a partner was diagnosed with a sexually transmitted infection.  If you are at risk for HIV, ask about PrEP medicine to prevent HIV.  Get tested for HIV at least once in your life, whether you are at risk for HIV or not.  Cancer screening tests  Cervical cancer screening: If you have a cervix, begin getting regular cervical cancer screening tests at age 21. Most people who have regular screenings with normal results can stop after age 65. Talk about this with your provider.  Breast cancer scan (mammogram): If you've ever had breasts, begin having regular mammograms starting at age 40. This is a scan to check for breast cancer.  Colon cancer screening: It is important to start screening for colon cancer at age 45.  Have a colonoscopy test every 10 years (or more often if you're at risk) Or, ask your provider about stool tests like a FIT test every year or Cologuard test every 3 years.  To learn more about your testing options, visit: https://www.[employer]/845429.pdf.  For help making a decision, visit: https://bit.ly/jm20656.  Prostate cancer screening test: If you have a prostate and are age 55 to 69, ask your provider if you would benefit from a yearly prostate cancer screening test.  Lung cancer screening: If you are a current or former smoker age 50 to 80, ask your care team if ongoing lung cancer screenings are right for you.  For informational purposes only. Not to replace the advice of your health care provider. Copyright   2023 [employer]. All rights reserved. Clinically reviewed by the [facility]. [place] [id number] - REV 01/24.

## 2024-03-05 ENCOUNTER — PATIENT OUTREACH (OUTPATIENT)
Dept: GASTROENTEROLOGY | Facility: CLINIC | Age: 54
End: 2024-03-05
Payer: COMMERCIAL

## 2024-03-11 ENCOUNTER — TELEPHONE (OUTPATIENT)
Dept: FAMILY MEDICINE | Facility: CLINIC | Age: 54
End: 2024-03-11
Payer: COMMERCIAL

## 2024-03-11 DIAGNOSIS — I25.10 CORONARY ARTERY DISEASE INVOLVING NATIVE CORONARY ARTERY OF NATIVE HEART WITHOUT ANGINA PECTORIS: ICD-10-CM

## 2024-03-11 DIAGNOSIS — R94.31 ABNORMAL ELECTROCARDIOGRAM: Primary | ICD-10-CM

## 2024-03-11 DIAGNOSIS — I10 ESSENTIAL HYPERTENSION: ICD-10-CM

## 2024-03-11 NOTE — TELEPHONE ENCOUNTER
Left message to call back for: Liz  Information to relay to patient: Please notify patient of Salma's message when she returns call. Thank you.

## 2024-03-11 NOTE — TELEPHONE ENCOUNTER
"----- Message from MICHELLE Lang CNP sent at 3/11/2024  6:53 AM CDT -----  Please call patient as she has not viewed [employer] results and relay the following:      \"Liz,  The EKG we did in clinic showed a couple of things: It is suggested the left, top portion of your heart is slightly enlarged. This was found on EKG in 2017 that I was able to find from outside records. The other finding is 'possible inferior infarct.' This can sometimes be a variation of normal, but other times mean that at some point in the last 7 years you had decreased blood flow to a portion of your heart. Generally I recommend something called a stress test, which is where you walk on a treadmill and they watch what your heart does as a result just to verify that the blood flow is normal. This would be a good idea as well with the finding of left atrial enlargement. Are you amenable to me ordering this test?\"  Thanks!   MICHELLE Lang CNP on 3/11/2024 at 6:53 AM      "

## 2024-03-13 NOTE — TELEPHONE ENCOUNTER
Left message to call back for: Liz- mailbox full, unable to leave VM  Information to relay to patient: Please relay Salma Blevins's message below.

## 2024-03-15 NOTE — TELEPHONE ENCOUNTER
Patient would like to proceed with recommended stress test.      Patient Returning Call    Reason for call:  Patient returning call regarding results.    Information relayed to patient:  Yes, results message from Salma Blevins CNP relayed to patient as written below.    Patient has additional questions:  No questions but would like to proceed with recommended stress test.      Could we send this information to you in [employer] or would you prefer to receive a phone call?:   Patient would prefer a phone call   Okay to leave a detailed message?: Yes at Cell number on file:    Telephone Information:   Mobile [phone]        Samantha Brown RN  [facility]

## 2024-03-22 NOTE — TELEPHONE ENCOUNTER
Please call patient and let her know that I received a notification that her insurance provider will not cover the stress test given the associated diagnosis. I reviewed all the diagnoses that would be covered, and none are applicable. I'm going to change the order to a nuclear stress test and they should reach out to me whether or not this will be covered. She should cancel her test that she has currently scheduled.

## 2024-03-22 NOTE — TELEPHONE ENCOUNTER
Called patient and relayed Salma Blevins's message.   Liz understands and She has no questions at this time.

## 2024-04-01 ENCOUNTER — HOSPITAL ENCOUNTER (OUTPATIENT)
Dept: CARDIOLOGY | Facility: CLINIC | Age: 54
Discharge: HOME | End: 2024-04-01
Payer: COMMERCIAL

## 2024-04-01 ENCOUNTER — HOSPITAL ENCOUNTER (OUTPATIENT)
Dept: NUCLEAR MEDICINE | Facility: CLINIC | Age: 54
Discharge: HOME | End: 2024-04-01
Payer: COMMERCIAL

## 2024-04-01 DIAGNOSIS — I10 ESSENTIAL HYPERTENSION: ICD-10-CM

## 2024-04-01 DIAGNOSIS — R94.31 ABNORMAL ELECTROCARDIOGRAM: ICD-10-CM

## 2024-04-01 LAB
CV STRESS CURRENT BP HE: NORMAL
CV STRESS CURRENT HR HE: 100
CV STRESS CURRENT HR HE: 101
CV STRESS CURRENT HR HE: 101
CV STRESS CURRENT HR HE: 106
CV STRESS CURRENT HR HE: 107
CV STRESS CURRENT HR HE: 111
CV STRESS CURRENT HR HE: 111
CV STRESS CURRENT HR HE: 112
CV STRESS CURRENT HR HE: 112
CV STRESS CURRENT HR HE: 115
CV STRESS CURRENT HR HE: 120
CV STRESS CURRENT HR HE: 127
CV STRESS CURRENT HR HE: 128
CV STRESS CURRENT HR HE: 134
CV STRESS CURRENT HR HE: 134
CV STRESS CURRENT HR HE: 140
CV STRESS CURRENT HR HE: 140
CV STRESS CURRENT HR HE: 144
CV STRESS CURRENT HR HE: 150
CV STRESS CURRENT HR HE: 151
CV STRESS CURRENT HR HE: 153
CV STRESS CURRENT HR HE: 165
CV STRESS CURRENT HR HE: 166
CV STRESS CURRENT HR HE: 167
CV STRESS CURRENT HR HE: 99
CV STRESS CURRENT HR HE: 99
CV STRESS DEVIATION TIME HE: NORMAL
CV STRESS ECHO PERCENT HR HE: NORMAL
CV STRESS EXERCISE STAGE HE: NORMAL
CV STRESS EXERCISE STAGE REACHED HE: NORMAL
CV STRESS FINAL RESTING BP HE: NORMAL
CV STRESS FINAL RESTING HR HE: 101
CV STRESS MAX HR HE: 167
CV STRESS MAX TREADMILL GRADE HE: 14
CV STRESS MAX TREADMILL SPEED HE: 3.4
CV STRESS PEAK DIA BP HE: NORMAL
CV STRESS PEAK SYS BP HE: NORMAL
CV STRESS PHASE HE: NORMAL
CV STRESS PROTOCOL HE: NORMAL
CV STRESS RESTING PT POSITION HE: NORMAL
CV STRESS RESTING PT POSITION HE: NORMAL
CV STRESS ST DEVIATION AMOUNT HE: NORMAL
CV STRESS ST DEVIATION ELEVATION HE: NORMAL
CV STRESS ST EVELATION AMOUNT HE: NORMAL
CV STRESS TEST TYPE HE: NORMAL
CV STRESS TOTAL STAGE TIME MIN 1 HE: NORMAL
NUC STRESS EJECTION FRACTION: 53 %
RATE PRESSURE PRODUCT: NORMAL
STRESS ECHO BASELINE DIASTOLIC HE: 95
STRESS ECHO BASELINE HR: 96
STRESS ECHO BASELINE SYSTOLIC BP: 150
STRESS ECHO CALCULATED PERCENT HR: 101 %
STRESS ECHO LAST STRESS DIASTOLIC BP: 94
STRESS ECHO LAST STRESS HR: 166
STRESS ECHO LAST STRESS SYSTOLIC BP: 168
STRESS ECHO POST ESTIMATED WORKLOAD: 8.5
STRESS ECHO POST EXERCISE DUR MIN: 7
STRESS ECHO POST EXERCISE DUR SEC: 0
STRESS ECHO TARGET HR: 165

## 2024-04-01 PROCEDURE — 93016 CV STRESS TEST SUPVJ ONLY: CPT | Performed by: INTERNAL MEDICINE

## 2024-04-01 PROCEDURE — A9500 TC99M SESTAMIBI: HCPCS

## 2024-04-01 PROCEDURE — 78452 HT MUSCLE IMAGE SPECT MULT: CPT | Mod: 26 | Performed by: INTERNAL MEDICINE

## 2024-04-01 PROCEDURE — 93018 CV STRESS TEST I&R ONLY: CPT | Performed by: INTERNAL MEDICINE

## 2024-04-01 PROCEDURE — 93017 CV STRESS TEST TRACING ONLY: CPT

## 2024-04-01 PROCEDURE — 343N000001 HC RX 343

## 2024-04-01 PROCEDURE — 78452 HT MUSCLE IMAGE SPECT MULT: CPT

## 2024-04-01 RX ADMIN — Medication 8.26 MILLICURIE: at 07:25

## 2024-04-01 RX ADMIN — Medication 31.1 MILLICURIE: at 08:20

## 2024-04-02 PROBLEM — I25.10 CORONARY ARTERY DISEASE INVOLVING NATIVE CORONARY ARTERY OF NATIVE HEART WITHOUT ANGINA PECTORIS: Status: ACTIVE | Noted: 2024-04-02

## 2024-04-29 ENCOUNTER — VIRTUAL VISIT (OUTPATIENT)
Dept: FAMILY MEDICINE | Facility: CLINIC | Age: 54
End: 2024-04-29
Payer: COMMERCIAL

## 2024-04-29 DIAGNOSIS — I25.10 CORONARY ARTERY DISEASE INVOLVING NATIVE CORONARY ARTERY OF NATIVE HEART WITHOUT ANGINA PECTORIS: ICD-10-CM

## 2024-04-29 DIAGNOSIS — R00.2 PALPITATIONS: Primary | ICD-10-CM

## 2024-04-29 PROCEDURE — 99213 OFFICE O/P EST LOW 20 MIN: CPT | Mod: 95

## 2024-04-29 RX ORDER — ATORVASTATIN CALCIUM 40 MG/1
40 TABLET, FILM COATED ORAL DAILY
Qty: 90 TABLET | Refills: 1 | Status: SHIPPED | OUTPATIENT
Start: 2024-04-29

## 2024-04-29 RX ORDER — ASPIRIN 81 MG/1
81 TABLET ORAL DAILY
Qty: 90 TABLET | Refills: 3 | Status: SHIPPED | OUTPATIENT
Start: 2024-04-29

## 2024-04-29 NOTE — PROGRESS NOTES
Liz is a 53 year old who is being evaluated via a billable video visit.    How would you like to obtain your AVS? MyChart  If the video visit is dropped, the invitation should be resent by: Text to cell phone: [phone]  Will anyone else be joining your video visit? No      Assessment & Plan   Problem List Items Addressed This Visit       Palpitations - Primary     Patient is interested in further workup of symptoms, given EKG and stress test findings which seems more than reasonable. She has had a negative blood workup thusfar. Will pursue event monitor - orders placed today.          Relevant Orders    ZIO PATCH MAIL OUT    Coronary artery disease involving native coronary artery of native heart without angina pectoris     Nuclear med test confirmed area of ischemia in the mid to distal anterior segment(s) of the left ventricle. She was deemed to be at intermediate risk of future events. She expresses a desire to mitigate risk as best as possible, so we discussed I would recommend treating her under the assumption that she has had a cardiovascular event given these findings. She will start a daily aspirin as well as statin therapy for a goal LDL of <70. Expected therapeutic events and potential side effects discussed. She will have updated lipid panel in 1-2 months and further titration if needed. Patient expressed understanding of and agreement with this plan. All questions were answered.         Relevant Medications    aspirin 81 MG EC tablet    atorvastatin (LIPITOR) 40 MG tablet    Other Relevant Orders    Lipid panel reflex to direct LDL Fasting      Subjective   Liz is a 53 year old, presenting for the following health issues:  Consult (Stress test.)        4/29/2024     2:45 PM   Additional Questions   Roomed by sac   Accompanied by self         4/29/2024     2:45 PM   Patient Reported Additional Medications   Patient reports taking the following new medications no     OV to discuss recent stress  test  Had EKG done due to new onset hypertension. Showed ischemia. Stress test showed no acute ischemic disease but confirmed presence of old area of infarction  Wonders about next steps.           Objective       Vitals:  No vitals were obtained today due to virtual visit.    Physical Exam   GENERAL: alert and no distress  EYES: Eyes grossly normal to inspection.  No discharge or erythema, or obvious scleral/conjunctival abnormalities.  RESP: No audible wheeze, cough, or visible cyanosis.    SKIN: Visible skin clear. No significant rash, abnormal pigmentation or lesions.  NEURO: Cranial nerves grossly intact.  Mentation and speech appropriate for age.  PSYCH: Appropriate affect, tone, and pace of words      Video-Visit Details    Type of service:  Video Visit   Originating Location (pt. Location): Home    Distant Location (provider location):  On-site  Platform used for Video Visit: Carlos  Signed Electronically by: MICHELLE Lang CNP

## 2024-04-29 NOTE — ASSESSMENT & PLAN NOTE
Patient is interested in further workup of symptoms, given EKG and stress test findings which seems more than reasonable. She has had a negative blood workup thusfar. Will pursue event monitor - orders placed today.

## 2024-04-29 NOTE — ASSESSMENT & PLAN NOTE
Nuclear med test confirmed area of ischemia in the mid to distal anterior segment(s) of the left ventricle. She was deemed to be at intermediate risk of future events. She expresses a desire to mitigate risk as best as possible, so we discussed I would recommend treating her under the assumption that she has had a cardiovascular event given these findings. She will start a daily aspirin as well as statin therapy for a goal LDL of <70. Expected therapeutic events and potential side effects discussed. She will have updated lipid panel in 1-2 months and further titration if needed. Patient expressed understanding of and agreement with this plan. All questions were answered.

## 2024-04-30 ENCOUNTER — ORDERS ONLY (AUTO-RELEASED) (OUTPATIENT)
Dept: FAMILY MEDICINE | Facility: CLINIC | Age: 54
End: 2024-04-30
Payer: COMMERCIAL

## 2024-04-30 DIAGNOSIS — R00.2 PALPITATIONS: ICD-10-CM

## 2024-05-22 PROCEDURE — 93244 EXT ECG>48HR<7D REV&INTERPJ: CPT | Performed by: INTERNAL MEDICINE

## 2024-06-27 ENCOUNTER — PATIENT OUTREACH (OUTPATIENT)
Dept: FAMILY MEDICINE | Facility: CLINIC | Age: 54
End: 2024-06-27
Payer: COMMERCIAL

## 2024-06-27 PROBLEM — Z12.4 CERVICAL CANCER SCREENING: Status: ACTIVE | Noted: 2023-07-31

## 2024-07-12 ENCOUNTER — OFFICE VISIT (OUTPATIENT)
Dept: FAMILY MEDICINE | Facility: CLINIC | Age: 54
End: 2024-07-12
Payer: COMMERCIAL

## 2024-07-12 VITALS
WEIGHT: 187.56 LBS | HEART RATE: 88 BPM | BODY MASS INDEX: 30.14 KG/M2 | DIASTOLIC BLOOD PRESSURE: 86 MMHG | OXYGEN SATURATION: 99 % | TEMPERATURE: 98.7 F | HEIGHT: 66 IN | SYSTOLIC BLOOD PRESSURE: 136 MMHG | RESPIRATION RATE: 16 BRPM

## 2024-07-12 DIAGNOSIS — I10 ESSENTIAL HYPERTENSION: Primary | ICD-10-CM

## 2024-07-12 DIAGNOSIS — I25.10 CORONARY ARTERY DISEASE INVOLVING NATIVE CORONARY ARTERY OF NATIVE HEART WITHOUT ANGINA PECTORIS: ICD-10-CM

## 2024-07-12 DIAGNOSIS — Z12.31 VISIT FOR SCREENING MAMMOGRAM: ICD-10-CM

## 2024-07-12 PROBLEM — R03.0 ELEVATED BLOOD PRESSURE READING WITHOUT DIAGNOSIS OF HYPERTENSION: Status: RESOLVED | Noted: 2024-03-04 | Resolved: 2024-07-12

## 2024-07-12 PROCEDURE — 80048 BASIC METABOLIC PNL TOTAL CA: CPT

## 2024-07-12 PROCEDURE — 99214 OFFICE O/P EST MOD 30 MIN: CPT

## 2024-07-12 PROCEDURE — G2211 COMPLEX E/M VISIT ADD ON: HCPCS

## 2024-07-12 PROCEDURE — 80061 LIPID PANEL: CPT

## 2024-07-12 PROCEDURE — 36415 COLL VENOUS BLD VENIPUNCTURE: CPT

## 2024-07-12 RX ORDER — LOSARTAN POTASSIUM 25 MG/1
25 TABLET ORAL DAILY
Qty: 90 TABLET | Refills: 1 | Status: SHIPPED | OUTPATIENT
Start: 2024-07-12 | End: 2024-08-19

## 2024-07-12 RX ORDER — LOSARTAN POTASSIUM 50 MG/1
50 TABLET ORAL DAILY
Qty: 90 TABLET | Refills: 1 | Status: SHIPPED | OUTPATIENT
Start: 2024-07-12 | End: 2024-07-12 | Stop reason: DRUGHIGH

## 2024-07-12 NOTE — ASSESSMENT & PLAN NOTE
Patient is tolerating the addition of moderate intensity statin therapy.  We discussed her concerns as it pertains to dosing (i.e. the difference between primary and secondary prevention). The intermittent foot pain that she is describing is not consistent with statin related myalgias.  Would recommend follow-up if this persists.  She is due for updated labs and these were obtained today.  Will plan to titrate medication to a goal of LDL of less than 70.  All additional questions were answered.

## 2024-07-12 NOTE — ASSESSMENT & PLAN NOTE
Today we discussed that given persistent elevations both in the clinic setting and at home, I would recommend initiation of a low-dose antihypertensive and patient is amenable to this. Recommend tight control of BP given known cardiac disease and strong family history. We discussed losartan 25 mg daily with a plan to recheck blood pressure in 3 to 4 weeks.  Updated BMP today as well as on recheck.  Expected therapeutic effects and potential side effects were discussed today.  Patient expressed understanding of and agreement this plan.  All questions were answered.

## 2024-07-12 NOTE — PROGRESS NOTES
Assessment & Plan   Problem List Items Addressed This Visit       Essential hypertension - Primary     Today we discussed that given persistent elevations both in the clinic setting and at home, I would recommend initiation of a low-dose antihypertensive and patient is amenable to this. Recommend tight control of BP given known cardiac disease and strong family history. We discussed losartan 25 mg daily with a plan to recheck blood pressure in 3 to 4 weeks.  Updated BMP today as well as on recheck.  Expected therapeutic effects and potential side effects were discussed today.  Patient expressed understanding of and agreement this plan.  All questions were answered.         Relevant Medications    losartan (COZAAR) 25 MG tablet    Other Relevant Orders    Basic metabolic panel  (Ca, Cl, CO2, Creat, Gluc, K, Na, BUN)    Basic metabolic panel  (Ca, Cl, CO2, Creat, Gluc, K, Na, BUN)    Coronary artery disease involving native coronary artery of native heart without angina pectoris     Patient is tolerating the addition of moderate intensity statin therapy.  We discussed her concerns as it pertains to dosing (i.e. the difference between primary and secondary prevention). The intermittent foot pain that she is describing is not consistent with statin related myalgias.  Would recommend follow-up if this persists.  She is due for updated labs and these were obtained today.  Will plan to titrate medication to a goal of LDL of less than 70.  All additional questions were answered.          Other Visit Diagnoses       Visit for screening mammogram        Relevant Orders    MA Screening Bilateral w/ Jayden           The longitudinal plan of care for the diagnosis(es)/condition(s) as documented were addressed during this visit. Due to the added complexity in care, I will continue to support Liz in the subsequent management and with ongoing continuity of care.    Duncan Hill is a 53 year old, presenting for the  "following health issues:  Hypertension (Follow-up/)        7/12/2024     2:34 PM   Additional Questions   Roomed by sac   Accompanied by self         7/12/2024     2:34 PM   Patient Reported Additional Medications   Patient reports taking the following new medications no     OV to discuss hyperlipidemia and hypertension.    She has been taking her atorvastatin. She has some questions about the dose ( is on a lower dose). She's also been having some left foot pain. Just wants to make sure this is not related to the medication.     She would also like to discuss initiating an anti-hypertensive. She has been checking her blood pressure at home intermittently since our last visit and has noticed fairly consistent elevations over 130/90. She has a strong family history of heart disease.    History of Present Illness       Hyperlipidemia:  She presents for follow up of hyperlipidemia.   She is taking medication to lower cholesterol. She is having myalgia or other side effects to statin medications.    Hypertension: She presents for follow up of hypertension.  She does check blood pressure  regularly outside of the clinic. Outside blood pressures have been over 140/90. She follows a low salt diet.     She eats 0-1 servings of fruits and vegetables daily.She consumes 0 sweetened beverage(s) daily.She exercises with enough effort to increase her heart rate 20 to 29 minutes per day.  She exercises with enough effort to increase her heart rate 3 or less days per week. She is missing 2 dose(s) of medications per week.       Objective    /86 (BP Location: Left arm, Patient Position: Sitting, Cuff Size: Adult Large)   Pulse 88   Temp 98.7  F (37.1  C) (Oral)   Resp 16   Ht 1.676 m (5' 6\")   Wt 85.1 kg (187 lb 9 oz)   LMP 10/31/2015 (Approximate)   SpO2 99%   BMI 30.27 kg/m    Body mass index is 30.27 kg/m .    Physical Exam  Vitals and nursing note reviewed.   Constitutional:       Appearance: Normal " appearance.   Cardiovascular:      Rate and Rhythm: Normal rate and regular rhythm.   Pulmonary:      Effort: Pulmonary effort is normal. No respiratory distress.   Neurological:      Mental Status: She is alert.   Psychiatric:         Mood and Affect: Mood normal.         Behavior: Behavior normal.         Thought Content: Thought content normal.           Signed Electronically by: MICHELLE Lang CNP

## 2024-07-13 LAB
ANION GAP SERPL CALCULATED.3IONS-SCNC: 11 MMOL/L (ref 7–15)
BUN SERPL-MCNC: 18.8 MG/DL (ref 6–20)
CALCIUM SERPL-MCNC: 9.3 MG/DL (ref 8.6–10)
CHLORIDE SERPL-SCNC: 107 MMOL/L (ref 98–107)
CHOLEST SERPL-MCNC: 182 MG/DL
CREAT SERPL-MCNC: 1.13 MG/DL (ref 0.51–0.95)
DEPRECATED HCO3 PLAS-SCNC: 23 MMOL/L (ref 22–29)
EGFRCR SERPLBLD CKD-EPI 2021: 57 ML/MIN/1.73M2
FASTING STATUS PATIENT QL REPORTED: NO
FASTING STATUS PATIENT QL REPORTED: NO
GLUCOSE SERPL-MCNC: 80 MG/DL (ref 70–99)
HDLC SERPL-MCNC: 64 MG/DL
LDLC SERPL CALC-MCNC: 92 MG/DL
NONHDLC SERPL-MCNC: 118 MG/DL
POTASSIUM SERPL-SCNC: 4.1 MMOL/L (ref 3.4–5.3)
SODIUM SERPL-SCNC: 141 MMOL/L (ref 135–145)
TRIGL SERPL-MCNC: 131 MG/DL

## 2024-08-19 ENCOUNTER — ALLIED HEALTH/NURSE VISIT (OUTPATIENT)
Dept: FAMILY MEDICINE | Facility: CLINIC | Age: 54
End: 2024-08-19
Payer: COMMERCIAL

## 2024-08-19 VITALS — HEART RATE: 88 BPM | SYSTOLIC BLOOD PRESSURE: 157 MMHG | DIASTOLIC BLOOD PRESSURE: 101 MMHG | OXYGEN SATURATION: 100 %

## 2024-08-19 DIAGNOSIS — I10 ESSENTIAL HYPERTENSION: Primary | ICD-10-CM

## 2024-08-19 PROCEDURE — 99207 PR NO CHARGE NURSE ONLY: CPT

## 2024-08-19 RX ORDER — LOSARTAN POTASSIUM 50 MG/1
50 TABLET ORAL DAILY
Qty: 90 TABLET | Refills: 1 | Status: SHIPPED | OUTPATIENT
Start: 2024-08-19

## 2024-08-19 NOTE — PROGRESS NOTES
7/12 OV note-       Essential hypertension - Primary       Today we discussed that given persistent elevations both in the clinic setting and at home, I would recommend initiation of a low-dose antihypertensive and patient is amenable to this. Recommend tight control of BP given known cardiac disease and strong family history. We discussed losartan 25 mg daily with a plan to recheck blood pressure in 3 to 4 weeks.  Updated BMP today as well as on recheck.  Expected therapeutic effects and potential side effects were discussed today.  Patient expressed understanding of and agreement this plan.  All questions were answered.            I met with Liz Jacobs at the request of MICHELLE Reyes CNP to recheck her blood pressure.  Blood pressure medications on the med list were reviewed with patient.    Patient has taken all medications as per usual regimen: Yes  Patient reports tolerating them without any issues or concerns: Yes    Vitals:    08/19/24 0936 08/19/24 0947   BP: (!) 146/88 (!) 157/101   BP Location: Left arm Left arm   Patient Position: Sitting Sitting   Cuff Size: Adult Large Adult Large   Pulse: 88    SpO2: 100%        After 5 minutes, the patient's blood pressure remained greater than or equal to 140/90.    Is the patient currently having any chest pain? No  Does the patient currently have a headache? No  Does the patient currently have any vision changes? No  Does the patient currently have any nausea? No  Does the patient currently have any abdominal pain? No    The previous encounter was reviewed.  The patient was discharged and the note will be sent to the provider for final review.

## 2024-08-19 NOTE — PROGRESS NOTES
Please have Liz double her losartan to 50mg and I will send in an updated prescription for her. She can double up on her current 25mg tablets. I would recommend a medication check with me in about a month. Thanks!   MICHELLE Lang CNP on 8/19/2024 at 10:06 AM

## 2024-08-19 NOTE — PROGRESS NOTES
Detailed vm left for patient notifying of providers message below. Encouraged patient to call back with questions or if need assistance in scheduling appointment.

## 2024-08-22 NOTE — TELEPHONE ENCOUNTER
Samson Reveles,   Patient is lost to pap tracking follow-up. Attempts to contact pt have been made per reminder process and there has been no reply and/or no appt scheduled.     Pap Hx:  Cervical dysplasia listed in medical hx, no results to review  07/18/23 NIL pap, Neg HR HPV Plan cotest in 1 year due 07/18/24 06/27/24 Reminder Mychart  7/29/24 Reminder call. Left msg  08/22/24 Lost to follow-up for pap tracking, yanelis routed to provider

## 2024-10-20 ENCOUNTER — HEALTH MAINTENANCE LETTER (OUTPATIENT)
Age: 54
End: 2024-10-20

## 2024-10-31 ENCOUNTER — TELEPHONE (OUTPATIENT)
Dept: FAMILY MEDICINE | Facility: CLINIC | Age: 54
End: 2024-10-31

## 2024-10-31 NOTE — TELEPHONE ENCOUNTER
Patient Quality Outreach    Patient is due for the following:   Hypertension -  Hypertension follow-up visit    Next Steps:   No follow up needed at this time.    Type of outreach:    Sent [employer] message.      Questions for provider review:    None           Rosi Raygoza MA

## 2024-11-26 ENCOUNTER — VIRTUAL VISIT (OUTPATIENT)
Dept: FAMILY MEDICINE | Facility: CLINIC | Age: 54
End: 2024-11-26
Payer: COMMERCIAL

## 2024-11-26 DIAGNOSIS — R79.89 ELEVATED SERUM CREATININE: ICD-10-CM

## 2024-11-26 DIAGNOSIS — M54.12 CERVICAL RADICULOPATHY: ICD-10-CM

## 2024-11-26 DIAGNOSIS — R20.0 NUMBNESS ON LEFT SIDE: Primary | ICD-10-CM

## 2024-11-26 DIAGNOSIS — I25.10 CORONARY ARTERY DISEASE INVOLVING NATIVE CORONARY ARTERY OF NATIVE HEART WITHOUT ANGINA PECTORIS: ICD-10-CM

## 2024-11-26 PROCEDURE — G2211 COMPLEX E/M VISIT ADD ON: HCPCS | Mod: 95

## 2024-11-26 PROCEDURE — 99214 OFFICE O/P EST MOD 30 MIN: CPT | Mod: 95

## 2024-11-26 RX ORDER — ATORVASTATIN CALCIUM 80 MG/1
80 TABLET, FILM COATED ORAL DAILY
Qty: 90 TABLET | Refills: 1 | Status: SHIPPED | OUTPATIENT
Start: 2024-11-26

## 2024-11-26 NOTE — ASSESSMENT & PLAN NOTE
Patient had a stress test completed after a routine ECG noted history of infarct. This confirmed an area of infarction. Patient did not have any symptoms of MI and it's unclear when this event occurred, but based on this finding it does appear she has had a myocardial infarction and as a result we have medically treated her as such with high intensity statin and daily aspirin.   Orders:    atorvastatin (LIPITOR) 80 MG tablet; Take 1 tablet (80 mg) by mouth daily.    Lipid panel reflex to direct LDL Fasting; Future

## 2024-11-26 NOTE — PROGRESS NOTES
Liz is a 54 year old who is being evaluated via a billable video visit.    How would you like to obtain your AVS? MyChart  If the video visit is dropped, the invitation should be resent by: Send to e-mail at: [email or web address]  Will anyone else be joining your video visit? No      Assessment & Plan   Assessment & Plan  Numbness on left side  Patient presents today via virtual visit at the request of her OB/GYN provider with subacute v chronic concerns for left-sided sensory deficits.  She notes that this has been present for many months.  She experiences primarily numbness in the left arm and that extends most significantly into the left pinky.  Is associated with some stiffness of the cervical spine but no overt pain.  It sounds as if patient's OB/GYN's biggest concern is risk reduction of cardiovascular events although I do not have any records of her visits available today.  I have very low suspicion that this represents something like a cerebrovascular event and would not recommend any specific precautions as it pertains to HRT with this complaint.  However, patient does have a history of cardiovascular disease as documented elsewhere and I would recommend adhering to guidelines as it pertains to that. My understanding is that a history of MI excludes patients from HRT, however patient's clinical picture is somewhat complicated (see elsewhere) therefore would leave that determination up to her prescribing provider.  Discussed that assessment is limited given the fact that we are completing a virtual visit today.  She does not have any concerning symptoms to report including other neurologic deficits, speech difficulties, etc.  With the concurrent neck stiffness specifically with turning to the left, that symptoms could represent some cervical radiculopathy.  Discussed obtaining imaging versus referral to spine and patient is opted for the latter.  Additional differentials include brachial plexopathy, ulnar  nerve neuropathy and I would recommend she return to clinic for additional assessment if her visit with spine is uneventful.   Orders:    Spine  Referral; Future    Cervical radiculopathy    Orders:    Spine  Referral; Future    Coronary artery disease involving native coronary artery of native heart without angina pectoris  Patient had a stress test completed after a routine ECG noted history of infarct. This confirmed an area of infarction. Patient did not have any symptoms of MI and it's unclear when this event occurred, but based on this finding it does appear she has had a myocardial infarction and as a result we have medically treated her as such with high intensity statin and daily aspirin.   Orders:    atorvastatin (LIPITOR) 80 MG tablet; Take 1 tablet (80 mg) by mouth daily.    Lipid panel reflex to direct LDL Fasting; Future    Elevated serum creatinine    Orders:    Basic metabolic panel  (Ca, Cl, CO2, Creat, Gluc, K, Na, BUN); Future    The longitudinal plan of care for the diagnosis(es)/condition(s) as documented were addressed during this visit. Due to the added complexity in care, I will continue to support Liz in the subsequent management and with ongoing continuity of care.    Subjective   Liz is a 54 year old, presenting for the following health issues:  Consult (Hormone therapy--C/o tingling and numbness LT side of body before GYN gives new RX.)      11/26/2024     1:42 PM   Additional Questions   Roomed by sac   Accompanied by self         11/26/2024     1:42 PM   Patient Reported Additional Medications   Patient reports taking the following new medications no     Video Start Time:  2:00pm    OV to discuss left sided numbness/altered sensation that has been present for an extended period of time. She is pursuing HRT with gynecology who have requested she meet with me regarding these symptoms.  Most noticeable in her pinky finger  No pain associated with these  symptoms  Turning head to the left can cause some tension.  Some changes in positioning (such as avoiding pressure on the elbow) can help relieve symptoms but do not resolve them.          Objective    Vitals - Patient Reported  Systolic (Patient Reported): 138  Diastolic (Patient Reported): 83    Physical Exam   GENERAL: alert and no distress  EYES: Eyes grossly normal to inspection.  No discharge or erythema, or obvious scleral/conjunctival abnormalities.  RESP: No audible wheeze, cough, or visible cyanosis.    SKIN: Visible skin clear. No significant rash, abnormal pigmentation or lesions.  NEURO: Cranial nerves grossly intact.  Mentation and speech appropriate for age.  PSYCH: Appropriate affect, tone, and pace of words      Video-Visit Details    Type of service:  Video Visit   Video End Time:2:16 PM  Originating Location (pt. Location): Home    Distant Location (provider location):  On-site  Platform used for Video Visit: Carlos  Signed Electronically by: MICHELLE Lang CNP

## 2024-11-26 NOTE — LETTER
November 26, 2024      Liz Jacobs  [address] [address]        To Whom It May Concern,     I have seen the above patient, Liz Jacobs, as it pertains to her concern for life sided arm numbness. Please see my plan as documented below:    Assessment & Plan  Numbness on left side  Patient presents today via virtual visit at the request of her OB/GYN provider with subacute v chronic concerns for left-sided sensory deficits.  She notes that this has been present for many months.  She experiences primarily numbness in the left arm and that extends most significantly into the left pinky.  Is associated with some stiffness of the cervical spine but no overt pain.  It sounds as if patient's OB/GYN's biggest concern is risk reduction of cardiovascular events although I do not have any records of her visits available today.  I have very low suspicion that this represents something like a cerebrovascular event and would not recommend any specific precautions as it pertains to HRT with this complaint.  However, patient does have a history of cardiovascular disease as documented elsewhere and I would recommend adhering to guidelines as it pertains to that. My understanding is that a history of MI excludes patients from HRT, however patient's clinical picture is somewhat complicated (see elsewhere) therefore would leave that determination up to her prescribing provider.  Discussed that assessment is limited given the fact that we are completing a virtual visit today.  She does not have any concerning symptoms to report including other neurologic deficits, speech difficulties, etc.  With the concurrent neck stiffness specifically with turning to the left, that symptoms could represent some cervical radiculopathy.  Discussed obtaining imaging versus referral to spine and patient is opted for the latter.  Additional differentials include brachial plexopathy, ulnar nerve neuropathy and I would recommend she  return to clinic for additional assessment if her visit with spine is uneventful.   Orders:    Spine  Referral; Future    Coronary artery disease involving native coronary artery of native heart without angina pectoris  Patient had a stress test completed after a routine ECG noted history of infarct. This confirmed an area of infarction. Patient did not have any symptoms of MI and it's unclear when this event occurred, but based on this finding it does appear she has had a myocardial infarction and as a result we have medically treated her as such with high intensity statin and daily aspirin.   Orders:    atorvastatin (LIPITOR) 80 MG tablet; Take 1 tablet (80 mg) by mouth daily.    Lipid panel reflex to direct LDL Fasting; Future  Sincerely,        MICHELLE Lang CNP

## 2024-11-27 ENCOUNTER — PATIENT OUTREACH (OUTPATIENT)
Dept: CARE COORDINATION | Facility: CLINIC | Age: 54
End: 2024-11-27
Payer: COMMERCIAL

## 2024-12-17 ENCOUNTER — PATIENT OUTREACH (OUTPATIENT)
Dept: CARE COORDINATION | Facility: CLINIC | Age: 54
End: 2024-12-17
Payer: COMMERCIAL

## 2024-12-19 ENCOUNTER — PATIENT OUTREACH (OUTPATIENT)
Dept: CARE COORDINATION | Facility: CLINIC | Age: 54
End: 2024-12-19
Payer: COMMERCIAL

## 2025-01-08 ENCOUNTER — OFFICE VISIT (OUTPATIENT)
Dept: PHYSICAL MEDICINE AND REHAB | Facility: CLINIC | Age: 55
End: 2025-01-08
Payer: COMMERCIAL

## 2025-01-08 VITALS
SYSTOLIC BLOOD PRESSURE: 155 MMHG | BODY MASS INDEX: 29.73 KG/M2 | WEIGHT: 185 LBS | HEART RATE: 85 BPM | HEIGHT: 66 IN | DIASTOLIC BLOOD PRESSURE: 99 MMHG

## 2025-01-08 DIAGNOSIS — R20.0 NUMBNESS ON LEFT SIDE: ICD-10-CM

## 2025-01-08 DIAGNOSIS — M54.12 CERVICAL RADICULOPATHY: ICD-10-CM

## 2025-01-08 ASSESSMENT — PAIN SCALES - GENERAL: PAINLEVEL_OUTOF10: NO PAIN (1)

## 2025-01-08 NOTE — PROGRESS NOTES
ASSESSMENT: Liz Jacobs is a 54 year old female presents for consultation at the request of PCP Salma Blevins, who presents today for new patient evaluation of :     -left side numbness    Patient endorses episodes of dizziness, imbalance, and 1yr of left arm and left leg numbness. There is no imaging to review. I recommend brain MRI and cspine MRI and orders placed. Recommended starting a course of PT and orders placed. We discussed gabapentin and pt would prefer to discuss with PCP and let us know if she would like to start this. I talked about the role of anti-inflammatories if cervical radiculopathy but would recommend waiting until mri imaging rules out stroke. We will call with results and decide plan.              1/8/2025     7:43 AM   OSWESTRY DISABILITY INDEX   Count 10    Sum 2    Oswestry Score (%) 4 %        Patient-reported            Diagnoses and all orders for this visit:  Numbness on left side  -     Spine  Referral  -     MR Brain w/o Contrast; Future  -     MR Cervical Spine w/o Contrast; Future  -     Physical Therapy  Referral; Future  Cervical radiculopathy  -     Spine  Referral  -     Physical Therapy  Referral; Future       PLAN:  Reviewed spine anatomy and disease process. Discussed diagnosis and treatment options with the patient today. A shared decision making model was used. The patient's values and choices were respected. The following represents what was discussed and decided upon by the provider and the patient.     -DIAGNOSTIC TESTS:  Images were personally reviewed and interpreted and explained to patient today using spine model.   -MRI cervical spine and brain without contrast ordered today    -PHYSICAL THERAPY:    -PT referral placed today  Discussed the importance of core strengthening, ROM, stretching exercises with the patient and how each of these entities is important in decreasing pain.  Explained to the patient that the purpose  of physical therapy is to teach the patient a home exercise program.  These exercises need to be performed every day in order to decrease pain and prevent future occurrences of pain.    -MEDICATIONS:    -discussed gabapentin, pt would prefer to hold off  -discussed role of nsaids, I would defer until imaging results available   Discussed multiple medication options today with patient. Discussed risks, side effects, and proper use of medications. Patient verbalized understanding.    -INTERVENTIONS:    -Did not discuss the role of injections with patient today. Patient would be a good candidate in the future for either epidural steroid injections or medial branch blocks if indicated based on symptoms and supported by imaging results.    -PATIENT EDUCATION: Total time of 40 minutes, on the day of service, spent with the patient, reviewing the chart, placing orders, and documenting.   -Today we also discussed the issues related to the pros and cons of the current treatment plan.    -FOLLOW-UP:  we will call with results of imaging    Advised patient to call the [facility] if symptoms worsen or if they develop red flag symptoms such as numbness, weakness, severe pain uncontrolled by current pain med regimen, or any new or worsening problems controlling bladder and bowel function.   ______________________________________________________________________    SUBJECTIVE:   Liz Jacobs  is a 54 year old female on asa 81mg with hx htn, enlarged thyroid gland, palpitations, cad, vitamin D deficiency, GERD, IBS, mumps who presents today for new patient evaluation of cervical radiculopathy    1yr+ of neck pain down the left arm and used to stop at the pinkie, but then this past year she feels it going down the left leg into the foot as well and including the entire hand at times. 1/10 pain today, 6 at worst, 0 at best. The levels will increase and decrease at random. Right now she can slightly feel it in the outside of the  hand. She never has full sensation in the   Pinkie. She feels sharp moments of pain along the left abdomen which goes away. At night, she notices the numbness. She does endorse more recent dizziness. A week ago she was sitting at her computer and had onset of severe dizziness which concerned her, but it resolved. She does feel a little off balance.    Sometimes cracking the neck would relieve some of the pain. But more recently she had difficulty turning her neck to the left which was interfering with her sleep.    No facial symptoms or falls.   She has recently gotten over plantar fasciitis which at the time impacted her walking, and this has resolved.  She has had some urgency of bowel/bladder and some diarrhea and constipation, but no incontinence.    States she has had a silent heart attack and has been working with her PCP on managing her bp.   No medications so far  She has not done any PT so far      -Treatment to Date:     -Medications:    Current Outpatient Medications   Medication Sig Dispense Refill    aspirin 81 MG EC tablet Take 1 tablet (81 mg) by mouth daily 90 tablet 3    atorvastatin (LIPITOR) 80 MG tablet Take 1 tablet (80 mg) by mouth daily. 90 tablet 1    losartan (COZAAR) 50 MG tablet Take 1 tablet (50 mg) by mouth daily 90 tablet 1     No current facility-administered medications for this visit.       No Known Allergies    Past Medical History:   Diagnosis Date    Abnormal Pap smear 15 years ago.    Adenomatous polyp of rectum     Cervical dysplasia     Chickenpox     Colon polyp 5 years ago    removed    Fibroid 6-7 years ago    s/p hysterectomy Dec. 17th, 2015    H/O gastroesophageal reflux (GERD)     Mumps         Patient Active Problem List   Diagnosis    Lipoma of skin and subcutaneous tissue    Overweight (BMI 25.0-29.9)    S/P abdominal supracervical subtotal hysterectomy    Essential hypertension    Enlarged thyroid gland    Vitamin D deficiency    Cervical cancer screening    History  of colonic polyps    Routine general medical examination at a health care facility    Palpitations    Coronary artery disease involving native coronary artery of native heart without angina pectoris       Past Surgical History:   Procedure Laterality Date    [facility] SURGICAL PATHOLOGY      COLONOSCOPY  10/22/2012    Procedure: COLONOSCOPY;  COLONOSCOPY ;  Surgeon: Andrew Nelson MD;  Location:  GI    HYSTERECTOMY SUPRACERVICAL, BILATERAL SALPINGO-OOPHORECTOMY, COMBINED Bilateral 2015    Procedure: COMBINED HYSTERECTOMY SUPRACERVICAL, SALPINGO-OOPHORECTOMY;  Surgeon: Cassidy Matson MD;  Location:  OR    LAPAROTOMY EXPLORATORY      MAMMOPLASTY REDUCTION      MYOMECTOMY UTERUS         Family History   Problem Relation Age of Onset    Hypertension Mother     Cerebrovascular Disease Mother 58    Uterine Cancer Mother     Heart Disease Father 42         of CHF    Diabetes Father     Hypertension Father     Hypertension Brother     Family History Negative Brother     Colon Cancer No family hx of        Reviewed past medical, surgical, and family history with patient found on new patient intake packet located in EMR [employer] tab.     SOCIAL HX: social alcohol use, nonsmoker, no heavy drinking, no rec drug use    Oswestry (RIANA) Questionnaire:        2025     7:43 AM   OSWESTRY DISABILITY INDEX   Count 10    Sum 2    Oswestry Score (%) 4 %        Patient-reported       Neck Disability Index:      2025     7:45 AM   Neck Disability Index (  Dirk H. and Sarina C. 1991. All rights reserved.; used with permission)   SECTION 1 - PAIN INTENSITY 1   SECTION 2 - PERSONAL CARE 0   SECTION 3 - LIFTING 0   SECTION 4 - READING 1   SECTION 5 - HEADACHES 1   SECTION 6 - CONCENTRATION 0   SECTION 7 - WORK 0   SECTION 8 - DRIVING 0   SECTION 9 - SLEEPING 0   SECTION 10 - RECREATION 0   Count 10    Sum 3    Raw Score: /50 3    Neck Disability Index Score: (%) 6 %        Patient-reported          PHQ-2 Score:        "1/8/2025     9:16 AM 3/4/2024     8:23 AM   PHQ-2 ( 1999 [employer])   Q1: Little interest or pleasure in doing things 0 0   Q2: Feeling down, depressed or hopeless 0 0   PHQ-2 Score 0 0   Q1: Little interest or pleasure in doing things  Not at all   Q2: Feeling down, depressed or hopeless  Not at all   PHQ-2 Score  0          ROS: positive for weight gain, chest pain, diarrhea, constipation, blood in the urine, dizziness. Specifically negative for bowel/bladder dysfunction, balance changes, headache, foot drop, fevers, chills, appetite changes, nausea/vomiting, unexplained weight loss. Otherwise 13 systems reviewed are negative. Please see the patient's intake questionnaire from today for details.    OBJECTIVE:  BP (!) 155/99 (BP Location: Left arm, Patient Position: Sitting, Cuff Size: Adult Large)   Pulse 85   Ht 5' 6\" (1.676 m)   Wt 185 lb (83.9 kg)   LMP 10/31/2015 (Approximate)   BMI 29.86 kg/m      PHYSICAL EXAMINATION:  --CONSTITUTIONAL: Vital signs as above. No acute distress. The patient is well nourished and well groomed.  --PSYCHIATRIC: The patient is awake, alert, oriented to person, place, and time, and answering questions appropriately with clear speech. Appropriate mood and affect   --HEENT: Sclera are non-injected. Extraocular muscles are intact. Moist oral mucosa.  --SKIN: Skin over the face, bilateral upper extremities, and posterior torso is clean, dry, intact without rashes.  --RESPIRATORY: Normal rhythm and effort. No abnormal accessory muscle breathing patterns noted.     --NEUROLOGIC: CN III-XII are grossly intact. Subtle left nasolabial flattening  --GROSS MOTOR: Easily arises from a seated position. Toe walking, heel walking are normal difficulty with tandem gait    --UPPER EXTREMITY MOTOR TESTING:  Shoulder abduction: right 5/5, left 5/5  Triceps: right 5/5 left 5/5  Biceps:right 5/5  left 5/5,   Hand :  right 5/5  left 5/5,   Intrinsics: right 5/5  left 5/5,   Extensors: right 5/5  " left 5/5,     --LOWER EXTREMITY MOTOR TESTING  Hip flexion: right 5/5  left 5/5,   Quads:right 5/5  left 5/5,   Hamstrings: right 5/5  left 5/5,   Dorsiflexion: right 5/5  left 5/5,   Plantarflexion: right 5/5  left 5/5,   EHL: right 5/5  left 5/5,     REFLEXES: 1/4 symmetric triceps, biceps, brachioradialis bilaterally. 2/4 symmetric patellar, achilles reflex bilaterally.  Negative Clonus, Babinski, and Villatoro's bilaterally.      SENSATION: of the upper and lower extremities is intact to light touch.     --VASCULAR: Warm upper and lower limbs bilaterally. No swelling or color change noted.    --CERVICAL SPINE: Inspection reveals no evidence of deformity or swelling. Range of motion is  limited in left rotation. No point tenderness to palpation of cervical spine. No tenderness to palpation of traps, scaps, or paraspinal musculature.    --SHOULDERS: Full range of motion on L. Some increase in numbness with shoulder movement. Negative empty can. No tenderness to palpation or swelling noted of AC joint.        RESULTS:   Prior medical records from [facility] were reviewed today.         IMAGING:       No results found.       This note was dictated using voice recognition software. Any grammatical or context distortions are unintentional and inherent to the software.       Christiane ORTEGA-C  [facility]  O. [phone]

## 2025-01-08 NOTE — PATIENT INSTRUCTIONS
~You have been referred for Physical Therapy to [facility]. They will call you to schedule an appointment.      Scheduling phone number is [phone] for [facility], or [place] location.  If you have not heard from the scheduling office within 2 business days, please call [phone] for ALL other locations.    Discussed the importance of core strengthening, ROM, stretching exercises and how each of these entities is important in decreasing pain and improving long term spine health.  The purpose of physical therapy is to teach you an individualized home exercise program.  These exercises need to be performed every day in order to decrease pain and prevent future occurrences of pain.           Imaging (Xray, CT, or MRI) has been ordered today.   Radiology will call you to schedule. Please call below if you do not hear from them in the next couple of days.     [facility] Radiology Scheduling:  Please call [phone] to schedule your image(s) (select option #1).    There are 3 different locations:    [facility]  [address] [place] [address] Imaging - [place]  [address] [facility] [address]   [place] [id number]    [place] [address]       We discussed risks and benefits of starting medication Gabapentin for your numbness/discomfort. Feel free to discuss with your primary care provider and let me know if you're interested in doing so.     We discussed possible anti-inflammatory however I would prefer to hold off until we know whether there is any concern for stroke on your MRI     ~Please call our [facility] Nurse Navigation line [phone] with any questions or concerns about your treatment plan, if symptoms worsen and you would like to be seen urgently, or if you have any new or worsening numbness, weakness, or problems controlling bladder and bowel  function.  ~You are also welcome to contact Christiane Jarquin via Zaz[employer], but please be aware that responses to Zazoo message may take 2-3 days due to the high volume of patients seen in clinic.

## 2025-01-08 NOTE — LETTER
1/8/2025      Liz Jacobs  [address] [facility] [address]      Dear Colleague,    Thank you for referring your patient, Liz Jacobs, to the [facility]. Please see a copy of my visit note below.    ASSESSMENT: Liz Jacobs is a 54 year old female presents for consultation at the request of PCP Salma Blevins, who presents today for new patient evaluation of :     -left side numbness    Patient endorses episodes of dizziness, imbalance, and 1yr of left arm and left leg numbness. There is no imaging to review. I recommend brain MRI and cspine MRI and orders placed. Recommended starting a course of PT and orders placed. We discussed gabapentin and pt would prefer to discuss with PCP and let us know if she would like to start this. I talked about the role of anti-inflammatories if cervical radiculopathy but would recommend waiting until mri imaging rules out stroke. We will call with results and decide plan.              1/8/2025     7:43 AM   OSWESTRY DISABILITY INDEX   Count 10    Sum 2    Oswestry Score (%) 4 %        Patient-reported            Diagnoses and all orders for this visit:  Numbness on left side  -     Spine  Referral  -     MR Brain w/o Contrast; Future  -     MR Cervical Spine w/o Contrast; Future  -     Physical Therapy  Referral; Future  Cervical radiculopathy  -     Spine  Referral  -     Physical Therapy  Referral; Future       PLAN:  Reviewed spine anatomy and disease process. Discussed diagnosis and treatment options with the patient today. A shared decision making model was used. The patient's values and choices were respected. The following represents what was discussed and decided upon by the provider and the patient.     -DIAGNOSTIC TESTS:  Images were personally reviewed and interpreted and explained to patient today using spine model.   -MRI cervical spine and brain without contrast ordered  today    -PHYSICAL THERAPY:    -PT referral placed today  Discussed the importance of core strengthening, ROM, stretching exercises with the patient and how each of these entities is important in decreasing pain.  Explained to the patient that the purpose of physical therapy is to teach the patient a home exercise program.  These exercises need to be performed every day in order to decrease pain and prevent future occurrences of pain.    -MEDICATIONS:    -discussed gabapentin, pt would prefer to hold off  -discussed role of nsaids, I would defer until imaging results available   Discussed multiple medication options today with patient. Discussed risks, side effects, and proper use of medications. Patient verbalized understanding.    -INTERVENTIONS:    -Did not discuss the role of injections with patient today. Patient would be a good candidate in the future for either epidural steroid injections or medial branch blocks if indicated based on symptoms and supported by imaging results.    -PATIENT EDUCATION: Total time of 40 minutes, on the day of service, spent with the patient, reviewing the chart, placing orders, and documenting.   -Today we also discussed the issues related to the pros and cons of the current treatment plan.    -FOLLOW-UP:  we will call with results of imaging    Advised patient to call the [facility] if symptoms worsen or if they develop red flag symptoms such as numbness, weakness, severe pain uncontrolled by current pain med regimen, or any new or worsening problems controlling bladder and bowel function.   ______________________________________________________________________    SUBJECTIVE:   Liz MARIBEL Reynaldo  is a 54 year old female on asa 81mg with hx htn, enlarged thyroid gland, palpitations, cad, vitamin D deficiency, GERD, IBS, mumps who presents today for new patient evaluation of cervical radiculopathy    1yr+ of neck pain down the left arm and used to stop at the pinkie, but then this  past year she feels it going down the left leg into the foot as well and including the entire hand at times. 1/10 pain today, 6 at worst, 0 at best. The levels will increase and decrease at random. Right now she can slightly feel it in the outside of the hand. She never has full sensation in the   Pinkie. She feels sharp moments of pain along the left abdomen which goes away. At night, she notices the numbness. She does endorse more recent dizziness. A week ago she was sitting at her computer and had onset of severe dizziness which concerned her, but it resolved. She does feel a little off balance.    Sometimes cracking the neck would relieve some of the pain. But more recently she had difficulty turning her neck to the left which was interfering with her sleep.    No facial symptoms or falls.   She has recently gotten over plantar fasciitis which at the time impacted her walking, and this has resolved.  She has had some urgency of bowel/bladder and some diarrhea and constipation, but no incontinence.    States she has had a silent heart attack and has been working with her PCP on managing her bp.   No medications so far  She has not done any PT so far      -Treatment to Date:     -Medications:    Current Outpatient Medications   Medication Sig Dispense Refill     aspirin 81 MG EC tablet Take 1 tablet (81 mg) by mouth daily 90 tablet 3     atorvastatin (LIPITOR) 80 MG tablet Take 1 tablet (80 mg) by mouth daily. 90 tablet 1     losartan (COZAAR) 50 MG tablet Take 1 tablet (50 mg) by mouth daily 90 tablet 1     No current facility-administered medications for this visit.       No Known Allergies    Past Medical History:   Diagnosis Date     Abnormal Pap smear 15 years ago.     Adenomatous polyp of rectum      Cervical dysplasia      Chickenpox      Colon polyp 5 years ago    removed     Fibroid 6-7 years ago    s/p hysterectomy Dec. 17th, 2015     H/O gastroesophageal reflux (GERD)      Mumps         Patient Active  Problem List   Diagnosis     Lipoma of skin and subcutaneous tissue     Overweight (BMI 25.0-29.9)     S/P abdominal supracervical subtotal hysterectomy     Essential hypertension     Enlarged thyroid gland     Vitamin D deficiency     Cervical cancer screening     History of colonic polyps     Routine general medical examination at a health care facility     Palpitations     Coronary artery disease involving native coronary artery of native heart without angina pectoris       Past Surgical History:   Procedure Laterality Date     CL AFF SURGICAL PATHOLOGY       COLONOSCOPY  10/22/2012    Procedure: COLONOSCOPY;  COLONOSCOPY ;  Surgeon: Andrew Nelson MD;  Location:  GI     HYSTERECTOMY SUPRACERVICAL, BILATERAL SALPINGO-OOPHORECTOMY, COMBINED Bilateral 2015    Procedure: COMBINED HYSTERECTOMY SUPRACERVICAL, SALPINGO-OOPHORECTOMY;  Surgeon: Cassidy Matson MD;  Location:  OR     LAPAROTOMY EXPLORATORY       MAMMOPLASTY REDUCTION       MYOMECTOMY UTERUS         Family History   Problem Relation Age of Onset     Hypertension Mother      Cerebrovascular Disease Mother 58     Uterine Cancer Mother      Heart Disease Father 42         of CHF     Diabetes Father      Hypertension Father      Hypertension Brother      Family History Negative Brother      Colon Cancer No family hx of        Reviewed past medical, surgical, and family history with patient found on new patient intake packet located in EMR Media tab.     SOCIAL HX: social alcohol use, nonsmoker, no heavy drinking, no rec drug use    Oswestry (RIANA) Questionnaire:        2025     7:43 AM   OSWESTRY DISABILITY INDEX   Count 10    Sum 2    Oswestry Score (%) 4 %        Patient-reported       Neck Disability Index:      2025     7:45 AM   Neck Disability Index (  Dirk H. and Sarina CASTILLO. 1991. All rights reserved.; used with permission)   SECTION 1 - PAIN INTENSITY 1   SECTION 2 - PERSONAL CARE 0   SECTION 3 - LIFTING 0   SECTION 4 -  "READING 1   SECTION 5 - HEADACHES 1   SECTION 6 - CONCENTRATION 0   SECTION 7 - WORK 0   SECTION 8 - DRIVING 0   SECTION 9 - SLEEPING 0   SECTION 10 - RECREATION 0   Count 10    Sum 3    Raw Score: /50 3    Neck Disability Index Score: (%) 6 %        Patient-reported          PHQ-2 Score:       1/8/2025     9:16 AM 3/4/2024     8:23 AM   PHQ-2 ( 1999 [employer])   Q1: Little interest or pleasure in doing things 0 0   Q2: Feeling down, depressed or hopeless 0 0   PHQ-2 Score 0 0   Q1: Little interest or pleasure in doing things  Not at all   Q2: Feeling down, depressed or hopeless  Not at all   PHQ-2 Score  0          ROS: positive for weight gain, chest pain, diarrhea, constipation, blood in the urine, dizziness. Specifically negative for bowel/bladder dysfunction, balance changes, headache, foot drop, fevers, chills, appetite changes, nausea/vomiting, unexplained weight loss. Otherwise 13 systems reviewed are negative. Please see the patient's intake questionnaire from today for details.    OBJECTIVE:  BP (!) 155/99 (BP Location: Left arm, Patient Position: Sitting, Cuff Size: Adult Large)   Pulse 85   Ht 5' 6\" (1.676 m)   Wt 185 lb (83.9 kg)   LMP 10/31/2015 (Approximate)   BMI 29.86 kg/m      PHYSICAL EXAMINATION:  --CONSTITUTIONAL: Vital signs as above. No acute distress. The patient is well nourished and well groomed.  --PSYCHIATRIC: The patient is awake, alert, oriented to person, place, and time, and answering questions appropriately with clear speech. Appropriate mood and affect   --HEENT: Sclera are non-injected. Extraocular muscles are intact. Moist oral mucosa.  --SKIN: Skin over the face, bilateral upper extremities, and posterior torso is clean, dry, intact without rashes.  --RESPIRATORY: Normal rhythm and effort. No abnormal accessory muscle breathing patterns noted.     --NEUROLOGIC: CN III-XII are grossly intact. Subtle left nasolabial flattening  --GROSS MOTOR: Easily arises from a seated position. " Toe walking, heel walking are normal difficulty with tandem gait    --UPPER EXTREMITY MOTOR TESTING:  Shoulder abduction: right 5/5, left 5/5  Triceps: right 5/5 left 5/5  Biceps:right 5/5  left 5/5,   Hand :  right 5/5  left 5/5,   Intrinsics: right 5/5  left 5/5,   Extensors: right 5/5  left 5/5,     --LOWER EXTREMITY MOTOR TESTING  Hip flexion: right 5/5  left 5/5,   Quads:right 5/5  left 5/5,   Hamstrings: right 5/5  left 5/5,   Dorsiflexion: right 5/5  left 5/5,   Plantarflexion: right 5/5  left 5/5,   EHL: right 5/5  left 5/5,     REFLEXES: 1/4 symmetric triceps, biceps, brachioradialis bilaterally. 2/4 symmetric patellar, achilles reflex bilaterally.  Negative Clonus, Babinski, and Villatoro's bilaterally.      SENSATION: of the upper and lower extremities is intact to light touch.     --VASCULAR: Warm upper and lower limbs bilaterally. No swelling or color change noted.    --CERVICAL SPINE: Inspection reveals no evidence of deformity or swelling. Range of motion is  limited in left rotation. No point tenderness to palpation of cervical spine. No tenderness to palpation of traps, scaps, or paraspinal musculature.    --SHOULDERS: Full range of motion on L. Some increase in numbness with shoulder movement. Negative empty can. No tenderness to palpation or swelling noted of AC joint.        RESULTS:   Prior medical records from [facility]where were reviewed today.         IMAGING:       No results found.       This note was dictated using voice recognition software. Any grammatical or context distortions are unintentional and inherent to the software.       Christiane Jarquin FNP-C  [facility]  O. [phone]      Again, thank you for allowing me to participate in the care of your patient.        Sincerely,        Christiane Jarquin, MICHELLE CNP    Electronically signed

## 2025-01-21 ENCOUNTER — HOSPITAL ENCOUNTER (OUTPATIENT)
Dept: MRI IMAGING | Facility: CLINIC | Age: 55
Discharge: HOME | End: 2025-01-21
Attending: NURSE PRACTITIONER
Payer: COMMERCIAL

## 2025-01-21 DIAGNOSIS — R20.0 NUMBNESS ON LEFT SIDE: ICD-10-CM

## 2025-01-21 PROCEDURE — 70551 MRI BRAIN STEM W/O DYE: CPT

## 2025-01-21 PROCEDURE — 72141 MRI NECK SPINE W/O DYE: CPT

## 2025-01-22 ENCOUNTER — TELEPHONE (OUTPATIENT)
Dept: PHYSICAL MEDICINE AND REHAB | Facility: CLINIC | Age: 55
End: 2025-01-22
Payer: COMMERCIAL

## 2025-01-22 DIAGNOSIS — R20.0 NUMBNESS ON LEFT SIDE: Primary | ICD-10-CM

## 2025-01-22 NOTE — TELEPHONE ENCOUNTER
"Also per PSP Christiane Jarquin, APRN, CNP: \"Please let Liz know that her brain MRI shows some mild chronic microvascular changes with some mild generalized atrophy. There are no masses, signs of stroke or other concerns. I would suggest we add a neurology referral to look for other causes of her left sided numbness.\"    Phone call to patient to review results and provider's recommendations. Left message to return call.     "

## 2025-01-22 NOTE — TELEPHONE ENCOUNTER
----- Message from Christiane Jarquin sent at 1/22/2025  8:02 AM CST -----  Please let Liz know that her MRI shows some degenerative wear and tear changes of the disc at C4-5 with some bulging of the disc at this level. I would suggest follow up appt to review her imaging and options

## 2025-01-23 NOTE — TELEPHONE ENCOUNTER
Patient returned call. Results given and explained. Answered some questions. Encouraged her to write further questions down so that PSP can address at follow up appointment. Transferred to scheduling to make the follow up appointment.      She is aware she will be receiving a call from neurology to get scheduled for a consultation.

## 2025-02-03 ENCOUNTER — PATIENT OUTREACH (OUTPATIENT)
Dept: CARE COORDINATION | Facility: CLINIC | Age: 55
End: 2025-02-03

## 2025-02-17 ENCOUNTER — PATIENT OUTREACH (OUTPATIENT)
Dept: CARE COORDINATION | Facility: CLINIC | Age: 55
End: 2025-02-17
Payer: COMMERCIAL

## 2025-03-05 ENCOUNTER — TRANSFERRED RECORDS (OUTPATIENT)
Dept: HEALTH INFORMATION MANAGEMENT | Facility: CLINIC | Age: 55
End: 2025-03-05
Payer: COMMERCIAL

## 2025-04-13 ENCOUNTER — HEALTH MAINTENANCE LETTER (OUTPATIENT)
Age: 55
End: 2025-04-13

## 2025-05-29 ENCOUNTER — TELEPHONE (OUTPATIENT)
Dept: FAMILY MEDICINE | Facility: CLINIC | Age: 55
End: 2025-05-29
Payer: COMMERCIAL

## 2025-05-29 NOTE — TELEPHONE ENCOUNTER
Patient Quality Outreach    Patient is due for the following:   Hypertension -  Hypertension follow-up visit    Action(s) Taken:   Schedule a Adult Preventative, BP follow-up    Type of outreach:    Phone, spoke to patient/parent. See 5/8/25 refill encounter.    Questions for provider review:    None         Rebeca Gant  Chart routed to None.

## (undated) RX ORDER — FENTANYL CITRATE 50 UG/ML
INJECTION, SOLUTION INTRAMUSCULAR; INTRAVENOUS
Status: DISPENSED
Start: 2018-02-20